# Patient Record
Sex: MALE | Race: WHITE | NOT HISPANIC OR LATINO
[De-identification: names, ages, dates, MRNs, and addresses within clinical notes are randomized per-mention and may not be internally consistent; named-entity substitution may affect disease eponyms.]

---

## 2019-04-11 ENCOUNTER — APPOINTMENT (OUTPATIENT)
Dept: INTERNAL MEDICINE | Facility: CLINIC | Age: 79
End: 2019-04-11
Payer: MEDICARE

## 2019-04-11 VITALS
OXYGEN SATURATION: 95 % | RESPIRATION RATE: 14 BRPM | BODY MASS INDEX: 29.99 KG/M2 | HEART RATE: 67 BPM | DIASTOLIC BLOOD PRESSURE: 81 MMHG | WEIGHT: 180 LBS | SYSTOLIC BLOOD PRESSURE: 173 MMHG | HEIGHT: 65 IN | TEMPERATURE: 98.2 F

## 2019-04-11 DIAGNOSIS — Z83.3 FAMILY HISTORY OF DIABETES MELLITUS: ICD-10-CM

## 2019-04-11 PROCEDURE — 99204 OFFICE O/P NEW MOD 45 MIN: CPT | Mod: 25

## 2019-04-11 PROCEDURE — 36415 COLL VENOUS BLD VENIPUNCTURE: CPT

## 2019-04-11 RX ORDER — DICLOFENAC SODIUM 1 %
1 KIT TOPICAL
Refills: 0 | Status: ACTIVE | COMMUNITY

## 2019-04-11 RX ORDER — LORATADINE 5 MG
17 TABLET,CHEWABLE ORAL
Refills: 0 | Status: ACTIVE | COMMUNITY

## 2019-04-11 NOTE — HEALTH RISK ASSESSMENT
[Good] : ~his/her~  mood as  good [] : No [No falls in past year] : Patient reported no falls in the past year [0] : 2) Feeling down, depressed, or hopeless: Not at all (0)

## 2019-04-11 NOTE — PHYSICAL EXAM

## 2019-04-11 NOTE — HISTORY OF PRESENT ILLNESS
[FreeTextEntry1] : here for physical no complaints [de-identified] : 78,yo wm with hx of CAD, DM,HTN, HYPERLIPIDEMIA,CANCER OF PROSTATE CAME TO THE OFFICE FOR PHYSICAL\par SEEN BY HIS CARDIOLOGIST RECENTLY AND TALD ALL WAS GOOD AS PER PATIENT

## 2019-04-20 LAB
ALBUMIN SERPL ELPH-MCNC: 5 G/DL
ALP BLD-CCNC: 78 U/L
ALT SERPL-CCNC: 14 U/L
ANION GAP SERPL CALC-SCNC: 16 MMOL/L
APPEARANCE: CLEAR
AST SERPL-CCNC: 22 U/L
BACTERIA: NEGATIVE
BASOPHILS # BLD AUTO: 0.04 K/UL
BASOPHILS NFR BLD AUTO: 0.8 %
BILIRUB SERPL-MCNC: 0.4 MG/DL
BILIRUBIN URINE: NEGATIVE
BLOOD URINE: NEGATIVE
BUN SERPL-MCNC: 14 MG/DL
CALCIUM SERPL-MCNC: 9.5 MG/DL
CHLORIDE SERPL-SCNC: 97 MMOL/L
CO2 SERPL-SCNC: 24 MMOL/L
COLOR: NORMAL
CREAT SERPL-MCNC: 0.77 MG/DL
EOSINOPHIL # BLD AUTO: 0.08 K/UL
EOSINOPHIL NFR BLD AUTO: 1.6 %
GLUCOSE QUALITATIVE U: NEGATIVE
GLUCOSE SERPL-MCNC: 100 MG/DL
HCT VFR BLD CALC: 46.1 %
HGB BLD-MCNC: 15 G/DL
HYALINE CASTS: 0 /LPF
IMM GRANULOCYTES NFR BLD AUTO: 0.2 %
KETONES URINE: NEGATIVE
LEUKOCYTE ESTERASE URINE: NEGATIVE
LYMPHOCYTES # BLD AUTO: 0.61 K/UL
LYMPHOCYTES NFR BLD AUTO: 12.5 %
MAN DIFF?: NORMAL
MCHC RBC-ENTMCNC: 30.7 PG
MCHC RBC-ENTMCNC: 32.5 GM/DL
MCV RBC AUTO: 94.3 FL
MICROSCOPIC-UA: NORMAL
MONOCYTES # BLD AUTO: 0.49 K/UL
MONOCYTES NFR BLD AUTO: 10 %
NEUTROPHILS # BLD AUTO: 3.66 K/UL
NEUTROPHILS NFR BLD AUTO: 74.9 %
NITRITE URINE: NEGATIVE
PH URINE: 6.5
PLATELET # BLD AUTO: 208 K/UL
POTASSIUM SERPL-SCNC: 4.1 MMOL/L
PROT SERPL-MCNC: 7.3 G/DL
PROTEIN URINE: NEGATIVE
PSA FREE FLD-MCNC: 19 %
PSA FREE SERPL-MCNC: 0.5 NG/ML
PSA SERPL-MCNC: 2.7 NG/ML
RBC # BLD: 4.89 M/UL
RBC # FLD: 12.8 %
RED BLOOD CELLS URINE: 1 /HPF
SODIUM SERPL-SCNC: 137 MMOL/L
SPECIFIC GRAVITY URINE: 1.01
SQUAMOUS EPITHELIAL CELLS: 0 /HPF
UROBILINOGEN URINE: NORMAL
WBC # FLD AUTO: 4.89 K/UL
WHITE BLOOD CELLS URINE: 0 /HPF

## 2019-04-22 LAB
CHOLEST SERPL-MCNC: 227 MG/DL
CHOLEST/HDLC SERPL: 4.2 RATIO
HDLC SERPL-MCNC: 54 MG/DL
LDLC SERPL CALC-MCNC: 151 MG/DL
TRIGL SERPL-MCNC: 108 MG/DL

## 2019-05-17 ENCOUNTER — MEDICATION RENEWAL (OUTPATIENT)
Age: 79
End: 2019-05-17

## 2019-06-17 ENCOUNTER — MEDICATION RENEWAL (OUTPATIENT)
Age: 79
End: 2019-06-17

## 2019-07-10 ENCOUNTER — APPOINTMENT (OUTPATIENT)
Dept: INTERNAL MEDICINE | Facility: CLINIC | Age: 79
End: 2019-07-10
Payer: MEDICARE

## 2019-07-10 VITALS
SYSTOLIC BLOOD PRESSURE: 129 MMHG | DIASTOLIC BLOOD PRESSURE: 76 MMHG | TEMPERATURE: 98.3 F | OXYGEN SATURATION: 97 % | WEIGHT: 182 LBS | RESPIRATION RATE: 12 BRPM | BODY MASS INDEX: 30.32 KG/M2 | HEIGHT: 65 IN | HEART RATE: 69 BPM

## 2019-07-10 PROCEDURE — 36415 COLL VENOUS BLD VENIPUNCTURE: CPT

## 2019-07-10 PROCEDURE — 99214 OFFICE O/P EST MOD 30 MIN: CPT | Mod: 25

## 2019-07-13 NOTE — HISTORY OF PRESENT ILLNESS
[FreeTextEntry1] : HERE FOR F/U.\par NO COMPLAINTS [de-identified] : 80 YO WM WITH HX OF DM, HTN, HYPERLIPIDEMIA, Ca PROSTATE CAME TO THE OFFICE FOR F/U.NEEDS TO RENEW MEDICATIONS\par SINCE LAST VISIT HE WAS SEEN BY  CONSULTANT\par PT. OFFERS NO COMPLAINTS

## 2019-07-13 NOTE — PHYSICAL EXAM
[No Acute Distress] : no acute distress [Well Developed] : well developed [Well Nourished] : well nourished [Well-Appearing] : well-appearing [PERRL] : pupils equal round and reactive to light [Normal Sclera/Conjunctiva] : normal sclera/conjunctiva [EOMI] : extraocular movements intact [Normal Oropharynx] : the oropharynx was normal [Normal Outer Ear/Nose] : the outer ears and nose were normal in appearance [No JVD] : no jugular venous distention [No Lymphadenopathy] : no lymphadenopathy [Supple] : supple [Thyroid Normal, No Nodules] : the thyroid was normal and there were no nodules present [Clear to Auscultation] : lungs were clear to auscultation bilaterally [No Respiratory Distress] : no respiratory distress  [No Accessory Muscle Use] : no accessory muscle use [Normal S1, S2] : normal S1 and S2 [Regular Rhythm] : with a regular rhythm [Normal Rate] : normal rate  [No Murmur] : no murmur heard [No Carotid Bruits] : no carotid bruits [No Varicosities] : no varicosities [Pedal Pulses Present] : the pedal pulses are present [No Abdominal Bruit] : a ~M bruit was not heard ~T in the abdomen [No Edema] : there was no peripheral edema [No Extremity Clubbing/Cyanosis] : no extremity clubbing/cyanosis [No Palpable Aorta] : no palpable aorta [Soft] : abdomen soft [Non Tender] : non-tender [No Masses] : no abdominal mass palpated [Non-distended] : non-distended [No HSM] : no HSM [Normal Bowel Sounds] : normal bowel sounds [Normal Posterior Cervical Nodes] : no posterior cervical lymphadenopathy [Normal Anterior Cervical Nodes] : no anterior cervical lymphadenopathy [No CVA Tenderness] : no CVA  tenderness [No Spinal Tenderness] : no spinal tenderness [No Joint Swelling] : no joint swelling [Grossly Normal Strength/Tone] : grossly normal strength/tone [Coordination Grossly Intact] : coordination grossly intact [No Rash] : no rash [Normal Gait] : normal gait [No Focal Deficits] : no focal deficits [Normal Affect] : the affect was normal [Deep Tendon Reflexes (DTR)] : deep tendon reflexes were 2+ and symmetric [Normal Insight/Judgement] : insight and judgment were intact [FreeTextEntry1] : DEFERRED

## 2019-07-15 LAB
ALBUMIN SERPL ELPH-MCNC: 4.6 G/DL
ALP BLD-CCNC: 75 U/L
ALT SERPL-CCNC: 12 U/L
AST SERPL-CCNC: 15 U/L
BILIRUB DIRECT SERPL-MCNC: 0.1 MG/DL
BILIRUB INDIRECT SERPL-MCNC: 0.2 MG/DL
BILIRUB SERPL-MCNC: 0.3 MG/DL
CHOLEST SERPL-MCNC: 206 MG/DL
CHOLEST/HDLC SERPL: 4.2 RATIO
ESTIMATED AVERAGE GLUCOSE: 128 MG/DL
GLUCOSE BS SERPL-MCNC: 115 MG/DL
HBA1C MFR BLD HPLC: 6.1 %
HDLC SERPL-MCNC: 49 MG/DL
LDLC SERPL CALC-MCNC: 129 MG/DL
PROT SERPL-MCNC: 6.8 G/DL
TRIGL SERPL-MCNC: 141 MG/DL

## 2019-11-11 ENCOUNTER — APPOINTMENT (OUTPATIENT)
Dept: INTERNAL MEDICINE | Facility: CLINIC | Age: 79
End: 2019-11-11
Payer: MEDICARE

## 2019-11-11 VITALS
DIASTOLIC BLOOD PRESSURE: 74 MMHG | RESPIRATION RATE: 14 BRPM | SYSTOLIC BLOOD PRESSURE: 120 MMHG | WEIGHT: 179 LBS | BODY MASS INDEX: 29.82 KG/M2 | HEART RATE: 71 BPM | OXYGEN SATURATION: 96 % | HEIGHT: 65 IN | TEMPERATURE: 98.2 F

## 2019-11-11 PROCEDURE — 90674 CCIIV4 VAC NO PRSV 0.5 ML IM: CPT

## 2019-11-11 PROCEDURE — 99213 OFFICE O/P EST LOW 20 MIN: CPT | Mod: 25

## 2019-11-11 PROCEDURE — G0008: CPT

## 2019-11-16 NOTE — PHYSICAL EXAM
[No Acute Distress] : no acute distress [Well Developed] : well developed [Well Nourished] : well nourished [Well-Appearing] : well-appearing [Normal Sclera/Conjunctiva] : normal sclera/conjunctiva [Normal Outer Ear/Nose] : the outer ears and nose were normal in appearance [PERRL] : pupils equal round and reactive to light [EOMI] : extraocular movements intact [No Lymphadenopathy] : no lymphadenopathy [No JVD] : no jugular venous distention [Normal Oropharynx] : the oropharynx was normal [Supple] : supple [Thyroid Normal, No Nodules] : the thyroid was normal and there were no nodules present [No Respiratory Distress] : no respiratory distress  [No Accessory Muscle Use] : no accessory muscle use [Clear to Auscultation] : lungs were clear to auscultation bilaterally [Normal Rate] : normal rate  [Regular Rhythm] : with a regular rhythm [Normal S1, S2] : normal S1 and S2 [No Carotid Bruits] : no carotid bruits [No Murmur] : no murmur heard [No Abdominal Bruit] : a ~M bruit was not heard ~T in the abdomen [No Varicosities] : no varicosities [Pedal Pulses Present] : the pedal pulses are present [No Edema] : there was no peripheral edema [No Palpable Aorta] : no palpable aorta [No Extremity Clubbing/Cyanosis] : no extremity clubbing/cyanosis [Non Tender] : non-tender [Soft] : abdomen soft [No Masses] : no abdominal mass palpated [Non-distended] : non-distended [No HSM] : no HSM [Normal Bowel Sounds] : normal bowel sounds [Normal Posterior Cervical Nodes] : no posterior cervical lymphadenopathy [No CVA Tenderness] : no CVA  tenderness [No Spinal Tenderness] : no spinal tenderness [Normal Anterior Cervical Nodes] : no anterior cervical lymphadenopathy [No Joint Swelling] : no joint swelling [Grossly Normal Strength/Tone] : grossly normal strength/tone [No Rash] : no rash [Coordination Grossly Intact] : coordination grossly intact [Deep Tendon Reflexes (DTR)] : deep tendon reflexes were 2+ and symmetric [Normal Gait] : normal gait [No Focal Deficits] : no focal deficits [Normal Insight/Judgement] : insight and judgment were intact [Normal Affect] : the affect was normal [FreeTextEntry1] : DEFERRED

## 2019-11-16 NOTE — HISTORY OF PRESENT ILLNESS
[FreeTextEntry1] : pt here for f/bp and for flu vaccine [de-identified] : 78 yo wm with hx of hyperlipidemia, DA,CAD and Ca prostate, CAME TO THE OFFICE FOR F/U. FEELING OK.SEEN BY  AND HAD BLOOD TESTS DONE. HE WAS TOLD THAT ALL WAS GOOD.

## 2019-11-16 NOTE — HISTORY OF PRESENT ILLNESS
[FreeTextEntry1] : pt here for f/bp and for flu vaccine [de-identified] : 78 yo wm with hx of hyperlipidemia, DA,CAD and Ca prostate, CAME TO THE OFFICE FOR F/U. FEELING OK.SEEN BY  AND HAD BLOOD TESTS DONE. HE WAS TOLD THAT ALL WAS GOOD.

## 2020-03-12 ENCOUNTER — RX RENEWAL (OUTPATIENT)
Age: 80
End: 2020-03-12

## 2020-04-10 ENCOUNTER — APPOINTMENT (OUTPATIENT)
Dept: INTERNAL MEDICINE | Facility: CLINIC | Age: 80
End: 2020-04-10

## 2020-05-12 ENCOUNTER — RX RENEWAL (OUTPATIENT)
Age: 80
End: 2020-05-12

## 2020-05-12 RX ORDER — LANCETS 30 GAUGE
EACH MISCELLANEOUS
Qty: 100 | Refills: 1 | Status: ACTIVE | COMMUNITY
Start: 2020-05-12 | End: 1900-01-01

## 2020-05-12 RX ORDER — TAMSULOSIN HCL 0.4 MG
0.4 CAPSULE ORAL
Refills: 0 | Status: COMPLETED | COMMUNITY
End: 2020-05-12

## 2020-05-14 ENCOUNTER — APPOINTMENT (OUTPATIENT)
Dept: INTERNAL MEDICINE | Facility: CLINIC | Age: 80
End: 2020-05-14
Payer: MEDICARE

## 2020-05-14 VITALS
TEMPERATURE: 98.1 F | HEART RATE: 70 BPM | BODY MASS INDEX: 30.16 KG/M2 | OXYGEN SATURATION: 98 % | WEIGHT: 181 LBS | DIASTOLIC BLOOD PRESSURE: 80 MMHG | SYSTOLIC BLOOD PRESSURE: 128 MMHG | RESPIRATION RATE: 15 BRPM | HEIGHT: 65 IN

## 2020-05-14 PROCEDURE — 36415 COLL VENOUS BLD VENIPUNCTURE: CPT

## 2020-05-14 PROCEDURE — G0439: CPT

## 2020-05-17 NOTE — HEALTH RISK ASSESSMENT
[Good] : ~his/her~  mood as  good [No] : In the past 12 months have you used drugs other than those required for medical reasons? No [No falls in past year] : Patient reported no falls in the past year [0] : 2) Feeling down, depressed, or hopeless: Not at all (0) [] : No [Audit-CScore] : 0 [RPN2Fhogh] : 0

## 2020-05-17 NOTE — HISTORY OF PRESENT ILLNESS
[FreeTextEntry1] : physical \par offers no complaints [de-identified] : [age] WM , WITH HX OF CAD, HTN,HLD AND DM CAME TO THE OFFICE FOR PHYSICAL. FEELING OK .NO COMPLAINTS

## 2020-05-18 LAB
ALBUMIN SERPL ELPH-MCNC: 4.7 G/DL
ALP BLD-CCNC: 76 U/L
ALT SERPL-CCNC: 12 U/L
ANION GAP SERPL CALC-SCNC: 15 MMOL/L
APPEARANCE: CLEAR
AST SERPL-CCNC: 18 U/L
BACTERIA: NEGATIVE
BASOPHILS # BLD AUTO: 0.04 K/UL
BASOPHILS NFR BLD AUTO: 0.8 %
BILIRUB SERPL-MCNC: 0.5 MG/DL
BILIRUBIN URINE: NEGATIVE
BLOOD URINE: NEGATIVE
BUN SERPL-MCNC: 12 MG/DL
CALCIUM SERPL-MCNC: 9.6 MG/DL
CHLORIDE SERPL-SCNC: 94 MMOL/L
CHOLEST SERPL-MCNC: 211 MG/DL
CHOLEST/HDLC SERPL: 3.9 RATIO
CO2 SERPL-SCNC: 25 MMOL/L
COLOR: NORMAL
CREAT SERPL-MCNC: 0.83 MG/DL
EOSINOPHIL # BLD AUTO: 0.11 K/UL
EOSINOPHIL NFR BLD AUTO: 2.2 %
ESTIMATED AVERAGE GLUCOSE: 131 MG/DL
GLUCOSE QUALITATIVE U: NEGATIVE
GLUCOSE SERPL-MCNC: 113 MG/DL
HBA1C MFR BLD HPLC: 6.2 %
HCT VFR BLD CALC: 43.4 %
HDLC SERPL-MCNC: 54 MG/DL
HGB BLD-MCNC: 14.1 G/DL
HYALINE CASTS: 0 /LPF
IMM GRANULOCYTES NFR BLD AUTO: 0.2 %
KETONES URINE: NEGATIVE
LDLC SERPL CALC-MCNC: 128 MG/DL
LEUKOCYTE ESTERASE URINE: NEGATIVE
LYMPHOCYTES # BLD AUTO: 0.86 K/UL
LYMPHOCYTES NFR BLD AUTO: 17 %
MAN DIFF?: NORMAL
MCHC RBC-ENTMCNC: 30 PG
MCHC RBC-ENTMCNC: 32.5 GM/DL
MCV RBC AUTO: 92.3 FL
MICROSCOPIC-UA: NORMAL
MONOCYTES # BLD AUTO: 0.61 K/UL
MONOCYTES NFR BLD AUTO: 12.1 %
NEUTROPHILS # BLD AUTO: 3.43 K/UL
NEUTROPHILS NFR BLD AUTO: 67.7 %
NITRITE URINE: NEGATIVE
PH URINE: 7
PLATELET # BLD AUTO: 230 K/UL
POTASSIUM SERPL-SCNC: 4.4 MMOL/L
PROT SERPL-MCNC: 6.6 G/DL
PROTEIN URINE: NEGATIVE
PSA SERPL-MCNC: 2.73 NG/ML
RBC # BLD: 4.7 M/UL
RBC # FLD: 12.7 %
RED BLOOD CELLS URINE: 1 /HPF
SODIUM SERPL-SCNC: 134 MMOL/L
SPECIFIC GRAVITY URINE: 1.01
SQUAMOUS EPITHELIAL CELLS: 0 /HPF
T3 SERPL-MCNC: 115 NG/DL
T4 FREE SERPL-MCNC: 1.2 NG/DL
T4 SERPL-MCNC: 6.9 UG/DL
TRIGL SERPL-MCNC: 140 MG/DL
TSH SERPL-ACNC: 2.72 UIU/ML
UROBILINOGEN URINE: NORMAL
WBC # FLD AUTO: 5.06 K/UL
WHITE BLOOD CELLS URINE: 0 /HPF

## 2020-09-16 ENCOUNTER — RX RENEWAL (OUTPATIENT)
Age: 80
End: 2020-09-16

## 2020-09-18 ENCOUNTER — RX RENEWAL (OUTPATIENT)
Age: 80
End: 2020-09-18

## 2020-09-18 ENCOUNTER — APPOINTMENT (OUTPATIENT)
Dept: INTERNAL MEDICINE | Facility: CLINIC | Age: 80
End: 2020-09-18
Payer: MEDICARE

## 2020-09-18 VITALS
BODY MASS INDEX: 35.65 KG/M2 | HEART RATE: 96 BPM | SYSTOLIC BLOOD PRESSURE: 152 MMHG | HEIGHT: 65 IN | WEIGHT: 214 LBS | RESPIRATION RATE: 15 BRPM | TEMPERATURE: 97.3 F | OXYGEN SATURATION: 97 % | DIASTOLIC BLOOD PRESSURE: 90 MMHG

## 2020-09-18 PROCEDURE — 99214 OFFICE O/P EST MOD 30 MIN: CPT | Mod: 25

## 2020-09-18 PROCEDURE — G0008: CPT

## 2020-09-18 PROCEDURE — 90686 IIV4 VACC NO PRSV 0.5 ML IM: CPT

## 2020-09-18 PROCEDURE — 36415 COLL VENOUS BLD VENIPUNCTURE: CPT

## 2020-09-18 RX ORDER — LANCETS 30 GAUGE
EACH MISCELLANEOUS
Qty: 100 | Refills: 1 | Status: ACTIVE | COMMUNITY
Start: 2020-09-18 | End: 1900-01-01

## 2020-09-20 NOTE — HEALTH RISK ASSESSMENT
[Never (0 pts)] : Never (0 points) [No] : In the past 12 months have you used drugs other than those required for medical reasons? No [No falls in past year] : Patient reported no falls in the past year [0] : 2) Feeling down, depressed, or hopeless: Not at all (0) [] : No [Audit-CScore] : 0 [de-identified] : lightly active [de-identified] : ADA diet [[id number]] : 0

## 2020-09-20 NOTE — PLAN
[FreeTextEntry1] : Blood tests sent to the lab \par \par Influenza vaccination administered today\par \par Screening for COVID-19 antibodies

## 2020-09-20 NOTE — HISTORY OF PRESENT ILLNESS
[FreeTextEntry1] : Follow up DM [de-identified] : AGUILAR PETERS is a 80 year old male patient with a PMHx of CAD, DM, HLD, and prostate CA who presents today for follow up. He is feeling well and offers no complaints. Patient requests immunization for influenza.

## 2020-09-20 NOTE — END OF VISIT
[FreeTextEntry3] : I, Samantha Fontaine, personally transcribed these services in the presence of Dr. Hema Snyder MD. I, Dr. Hema Snyder MD, personally performed the services described in this documentation as scribed by Samantha Fontanie in my presence and it is both accurate and complete.

## 2020-09-20 NOTE — PHYSICAL EXAM
[No Acute Distress] : no acute distress [Well Nourished] : well nourished [Well Developed] : well developed [Well-Appearing] : well-appearing [Normal Sclera/Conjunctiva] : normal sclera/conjunctiva [PERRL] : pupils equal round and reactive to light [EOMI] : extraocular movements intact [Normal Outer Ear/Nose] : the outer ears and nose were normal in appearance [Normal Oropharynx] : the oropharynx was normal [No JVD] : no jugular venous distention [No Lymphadenopathy] : no lymphadenopathy [Supple] : supple [Thyroid Normal, No Nodules] : the thyroid was normal and there were no nodules present [No Respiratory Distress] : no respiratory distress  [No Accessory Muscle Use] : no accessory muscle use [Clear to Auscultation] : lungs were clear to auscultation bilaterally [Normal Rate] : normal rate  [Regular Rhythm] : with a regular rhythm [Normal S1, S2] : normal S1 and S2 [No Murmur] : no murmur heard [No Carotid Bruits] : no carotid bruits [No Abdominal Bruit] : a ~M bruit was not heard ~T in the abdomen [No Varicosities] : no varicosities [Pedal Pulses Present] : the pedal pulses are present [No Edema] : there was no peripheral edema [No Palpable Aorta] : no palpable aorta [No Extremity Clubbing/Cyanosis] : no extremity clubbing/cyanosis [Soft] : abdomen soft [Non Tender] : non-tender [Non-distended] : non-distended [No Masses] : no abdominal mass palpated [No HSM] : no HSM [Normal Bowel Sounds] : normal bowel sounds [Normal Posterior Cervical Nodes] : no posterior cervical lymphadenopathy [Normal Anterior Cervical Nodes] : no anterior cervical lymphadenopathy [No CVA Tenderness] : no CVA  tenderness [No Spinal Tenderness] : no spinal tenderness [No Joint Swelling] : no joint swelling [Grossly Normal Strength/Tone] : grossly normal strength/tone [No Rash] : no rash [Coordination Grossly Intact] : coordination grossly intact [No Focal Deficits] : no focal deficits [Normal Gait] : normal gait [Normal Affect] : the affect was normal [Normal Insight/Judgement] : insight and judgment were intact [FreeTextEntry1] : deferred

## 2020-09-22 LAB
ALBUMIN SERPL ELPH-MCNC: 4.8 G/DL
ALP BLD-CCNC: 80 U/L
ALT SERPL-CCNC: 12 U/L
ANION GAP SERPL CALC-SCNC: 13 MMOL/L
APPEARANCE: CLEAR
AST SERPL-CCNC: 19 U/L
BACTERIA: NEGATIVE
BASOPHILS # BLD AUTO: 0.03 K/UL
BASOPHILS NFR BLD AUTO: 0.8 %
BILIRUB SERPL-MCNC: 0.4 MG/DL
BILIRUBIN URINE: NEGATIVE
BLOOD URINE: NEGATIVE
BUN SERPL-MCNC: 11 MG/DL
CALCIUM SERPL-MCNC: 9.6 MG/DL
CHLORIDE SERPL-SCNC: 96 MMOL/L
CHOLEST SERPL-MCNC: 181 MG/DL
CHOLEST/HDLC SERPL: 3.2 RATIO
CO2 SERPL-SCNC: 25 MMOL/L
COLOR: NORMAL
CREAT SERPL-MCNC: 0.85 MG/DL
CREAT SPEC-SCNC: 48 MG/DL
EOSINOPHIL # BLD AUTO: 0.06 K/UL
EOSINOPHIL NFR BLD AUTO: 1.6 %
ESTIMATED AVERAGE GLUCOSE: 128 MG/DL
GLUCOSE QUALITATIVE U: NEGATIVE
GLUCOSE SERPL-MCNC: 106 MG/DL
HBA1C MFR BLD HPLC: 6.1 %
HCT VFR BLD CALC: 41.9 %
HDLC SERPL-MCNC: 57 MG/DL
HGB BLD-MCNC: 13.9 G/DL
HYALINE CASTS: 0 /LPF
IMM GRANULOCYTES NFR BLD AUTO: 0 %
KETONES URINE: NEGATIVE
LDLC SERPL CALC-MCNC: 110 MG/DL
LEUKOCYTE ESTERASE URINE: NEGATIVE
LYMPHOCYTES # BLD AUTO: 0.58 K/UL
LYMPHOCYTES NFR BLD AUTO: 15.9 %
MAN DIFF?: NORMAL
MCHC RBC-ENTMCNC: 30.2 PG
MCHC RBC-ENTMCNC: 33.2 GM/DL
MCV RBC AUTO: 91.1 FL
MICROALBUMIN 24H UR DL<=1MG/L-MCNC: <1.2 MG/DL
MICROALBUMIN/CREAT 24H UR-RTO: NORMAL MG/G
MICROSCOPIC-UA: NORMAL
MONOCYTES # BLD AUTO: 0.4 K/UL
MONOCYTES NFR BLD AUTO: 11 %
NEUTROPHILS # BLD AUTO: 2.57 K/UL
NEUTROPHILS NFR BLD AUTO: 70.7 %
NITRITE URINE: NEGATIVE
PH URINE: 6.5
PLATELET # BLD AUTO: 245 K/UL
POTASSIUM SERPL-SCNC: 5.3 MMOL/L
PROT SERPL-MCNC: 6.7 G/DL
PROTEIN URINE: NEGATIVE
PSA SERPL-MCNC: 0.67 NG/ML
RBC # BLD: 4.6 M/UL
RBC # FLD: 12.6 %
RED BLOOD CELLS URINE: 1 /HPF
SARS-COV-2 IGG SERPL IA-ACNC: 0.01 INDEX
SARS-COV-2 IGG SERPL QL IA: NEGATIVE
SODIUM SERPL-SCNC: 134 MMOL/L
SPECIFIC GRAVITY URINE: 1.01
SQUAMOUS EPITHELIAL CELLS: 0 /HPF
TRIGL SERPL-MCNC: 73 MG/DL
UROBILINOGEN URINE: NORMAL
WBC # FLD AUTO: 3.64 K/UL
WHITE BLOOD CELLS URINE: 0 /HPF

## 2021-03-04 ENCOUNTER — RX RENEWAL (OUTPATIENT)
Age: 81
End: 2021-03-04

## 2021-03-17 ENCOUNTER — RX RENEWAL (OUTPATIENT)
Age: 81
End: 2021-03-17

## 2021-03-29 ENCOUNTER — RX RENEWAL (OUTPATIENT)
Age: 81
End: 2021-03-29

## 2021-05-31 ENCOUNTER — RX RENEWAL (OUTPATIENT)
Age: 81
End: 2021-05-31

## 2021-06-09 ENCOUNTER — APPOINTMENT (OUTPATIENT)
Dept: INTERNAL MEDICINE | Facility: CLINIC | Age: 81
End: 2021-06-09
Payer: MEDICARE

## 2021-06-09 VITALS
HEIGHT: 65 IN | OXYGEN SATURATION: 96 % | RESPIRATION RATE: 14 BRPM | HEART RATE: 69 BPM | WEIGHT: 178 LBS | DIASTOLIC BLOOD PRESSURE: 72 MMHG | TEMPERATURE: 97.1 F | BODY MASS INDEX: 29.66 KG/M2 | SYSTOLIC BLOOD PRESSURE: 130 MMHG

## 2021-06-09 PROCEDURE — 36415 COLL VENOUS BLD VENIPUNCTURE: CPT

## 2021-06-09 PROCEDURE — G0439: CPT

## 2021-06-09 PROCEDURE — 93000 ELECTROCARDIOGRAM COMPLETE: CPT | Mod: 59

## 2021-06-11 NOTE — PHYSICAL EXAM
[No Acute Distress] : no acute distress [Well Nourished] : well nourished [Well Developed] : well developed [Well-Appearing] : well-appearing [Normal Sclera/Conjunctiva] : normal sclera/conjunctiva [PERRL] : pupils equal round and reactive to light [EOMI] : extraocular movements intact [Normal Outer Ear/Nose] : the outer ears and nose were normal in appearance [Normal Oropharynx] : the oropharynx was normal [No JVD] : no jugular venous distention [No Lymphadenopathy] : no lymphadenopathy [Supple] : supple [Thyroid Normal, No Nodules] : the thyroid was normal and there were no nodules present [No Respiratory Distress] : no respiratory distress  [No Accessory Muscle Use] : no accessory muscle use [Clear to Auscultation] : lungs were clear to auscultation bilaterally [Normal Rate] : normal rate  [Regular Rhythm] : with a regular rhythm [Normal S1, S2] : normal S1 and S2 [No Murmur] : no murmur heard [No Carotid Bruits] : no carotid bruits [No Abdominal Bruit] : a ~M bruit was not heard ~T in the abdomen [No Varicosities] : no varicosities [Pedal Pulses Present] : the pedal pulses are present [No Edema] : there was no peripheral edema [No Palpable Aorta] : no palpable aorta [No Extremity Clubbing/Cyanosis] : no extremity clubbing/cyanosis [Soft] : abdomen soft [Non Tender] : non-tender [Non-distended] : non-distended [No Masses] : no abdominal mass palpated [No HSM] : no HSM [Normal Bowel Sounds] : normal bowel sounds [Normal Sphincter Tone] : normal sphincter tone [No Mass] : no mass [Normal Posterior Cervical Nodes] : no posterior cervical lymphadenopathy [Normal Anterior Cervical Nodes] : no anterior cervical lymphadenopathy [No CVA Tenderness] : no CVA  tenderness [No Spinal Tenderness] : no spinal tenderness [No Joint Swelling] : no joint swelling [Grossly Normal Strength/Tone] : grossly normal strength/tone [No Rash] : no rash [Coordination Grossly Intact] : coordination grossly intact [No Focal Deficits] : no focal deficits [Normal Gait] : normal gait [Deep Tendon Reflexes (DTR)] : deep tendon reflexes were 2+ and symmetric [Normal Affect] : the affect was normal [Normal Insight/Judgement] : insight and judgment were intact [Stool Occult Blood] : stool negative for occult blood [de-identified] : 2/6 DEBBIE/LSB [FreeTextEntry1] : guaiac negative

## 2021-06-11 NOTE — HEALTH RISK ASSESSMENT
[Never (0 pts)] : Never (0 points) [No] : In the past 12 months have you used drugs other than those required for medical reasons? No [No falls in past year] : Patient reported no falls in the past year [0] : 2) Feeling down, depressed, or hopeless: Not at all (0) [Good] : ~his/her~  mood as  good [Audit-CScore] : 0 [] : No [de-identified] : lightly active [de-identified] : ADA diet [[id number]] : 0 [ColonoscopyDate] : 01/2017

## 2021-06-11 NOTE — HISTORY OF PRESENT ILLNESS
[FreeTextEntry1] : Physical examination  [de-identified] : AGUILAR PETERS is a 81 year old male with a PMHx of CAD, DM, HLD, and prostate CA who presents today for physical examination. He is feeling okay and offers no specific complaints. \par \par Pt has completed COVID-19 vaccine series (Moderna) on 04/14/2021.

## 2021-06-11 NOTE — END OF VISIT
[FreeTextEntry3] : I, Jelena Orellana, personally transcribed these services in the presence of Dr. Hema Snyder MD. I, Dr. Hema Snyder MD, personally performed the services described in this documentation as scribed by Jelena Orellana in my presence and it is both accurate and complete.\par

## 2021-06-11 NOTE — PLAN
[FreeTextEntry1] : Blood tests and urine sent to the lab\par \par EKG\par \par Cardiology evaluation

## 2021-06-13 LAB
25(OH)D3 SERPL-MCNC: 41.7 NG/ML
ALBUMIN SERPL ELPH-MCNC: 4.8 G/DL
ALP BLD-CCNC: 85 U/L
ALT SERPL-CCNC: 15 U/L
ANION GAP SERPL CALC-SCNC: 15 MMOL/L
APPEARANCE: CLEAR
AST SERPL-CCNC: 20 U/L
BACTERIA: NEGATIVE
BILIRUB SERPL-MCNC: 0.4 MG/DL
BILIRUBIN URINE: NEGATIVE
BLOOD URINE: NEGATIVE
BUN SERPL-MCNC: 12 MG/DL
CALCIUM SERPL-MCNC: 9.4 MG/DL
CHLORIDE SERPL-SCNC: 96 MMOL/L
CHOLEST SERPL-MCNC: 183 MG/DL
CO2 SERPL-SCNC: 22 MMOL/L
COLOR: NORMAL
CREAT SERPL-MCNC: 0.81 MG/DL
ESTIMATED AVERAGE GLUCOSE: 131 MG/DL
GLUCOSE QUALITATIVE U: NEGATIVE
GLUCOSE SERPL-MCNC: 110 MG/DL
HBA1C MFR BLD HPLC: 6.2 %
HDLC SERPL-MCNC: 53 MG/DL
HYALINE CASTS: 0 /LPF
KETONES URINE: NEGATIVE
LDLC SERPL CALC-MCNC: 113 MG/DL
LEUKOCYTE ESTERASE URINE: NEGATIVE
MICROSCOPIC-UA: NORMAL
NITRITE URINE: NEGATIVE
NONHDLC SERPL-MCNC: 131 MG/DL
PH URINE: 7
POTASSIUM SERPL-SCNC: 4.9 MMOL/L
PROT SERPL-MCNC: 6.8 G/DL
PROTEIN URINE: NEGATIVE
PSA SERPL-MCNC: 0.38 NG/ML
RED BLOOD CELLS URINE: 2 /HPF
SODIUM SERPL-SCNC: 133 MMOL/L
SPECIFIC GRAVITY URINE: 1.01
SQUAMOUS EPITHELIAL CELLS: 0 /HPF
T3 SERPL-MCNC: 115 NG/DL
T4 FREE SERPL-MCNC: 1.1 NG/DL
T4 SERPL-MCNC: 6.7 UG/DL
TRIGL SERPL-MCNC: 86 MG/DL
TSH SERPL-ACNC: 1.86 UIU/ML
UROBILINOGEN URINE: NORMAL
VIT B12 SERPL-MCNC: 307 PG/ML
WHITE BLOOD CELLS URINE: 0 /HPF

## 2021-06-14 LAB
BASOPHILS # BLD AUTO: 0.04 K/UL
BASOPHILS NFR BLD AUTO: 0.7 %
EOSINOPHIL # BLD AUTO: 0.04 K/UL
EOSINOPHIL NFR BLD AUTO: 0.7 %
HCT VFR BLD CALC: 41.7 %
HGB BLD-MCNC: 13.8 G/DL
IMM GRANULOCYTES NFR BLD AUTO: 0.4 %
LYMPHOCYTES # BLD AUTO: 0.75 K/UL
LYMPHOCYTES NFR BLD AUTO: 13.9 %
MAN DIFF?: NORMAL
MCHC RBC-ENTMCNC: 30.1 PG
MCHC RBC-ENTMCNC: 33.1 GM/DL
MCV RBC AUTO: 90.8 FL
MONOCYTES # BLD AUTO: 0.52 K/UL
MONOCYTES NFR BLD AUTO: 9.6 %
NEUTROPHILS # BLD AUTO: 4.04 K/UL
NEUTROPHILS NFR BLD AUTO: 74.7 %
PLATELET # BLD AUTO: 217 K/UL
RBC # BLD: 4.59 M/UL
RBC # FLD: 12.8 %
WBC # FLD AUTO: 5.41 K/UL

## 2021-06-15 ENCOUNTER — APPOINTMENT (OUTPATIENT)
Dept: HEART AND VASCULAR | Facility: CLINIC | Age: 81
End: 2021-06-15
Payer: MEDICARE

## 2021-06-15 VITALS
SYSTOLIC BLOOD PRESSURE: 138 MMHG | WEIGHT: 178 LBS | OXYGEN SATURATION: 97 % | RESPIRATION RATE: 14 BRPM | BODY MASS INDEX: 29.66 KG/M2 | TEMPERATURE: 98.2 F | HEART RATE: 68 BPM | HEIGHT: 65 IN | DIASTOLIC BLOOD PRESSURE: 73 MMHG

## 2021-06-15 PROCEDURE — 99204 OFFICE O/P NEW MOD 45 MIN: CPT

## 2021-06-16 NOTE — ASSESSMENT
[FreeTextEntry1] : 81-year-old man with past medical history of CAD s/p PCI in 2010 at [facility],   diabetes, hyperlipidemia here for first evaluation.\par \par CAD s/p PCI \par -The setting of remote history of PCI without recent ischemia eval,  will obtain an nuclear stress test to rule out any ischemia\par -Echocardiogram to rule out any wall motion abnormalities\par -Continue aspirin 81 mg daily\par -Continue clopidogrel 75 mg daily\par -Continue metoprolol tartrate  12.5 mg po bid (consider switching to metoprolol succinate)\par \par HTN\par -borderline high\par -Continue losartan 25 mg daily\par -Continue metoprolol tartrate  12.5 mg po bid (consider switching to metoprolol succinate)\par -echo as above\par -CMP wnl 6/13/2021\par \par HLD\par  -fasting lipid panel \par \par f/u in 3 weeks for echo and  nuc stress test results

## 2021-06-16 NOTE — REASON FOR VISIT
[FreeTextEntry1] : 81-year-old man with past medical history of CAD s/p PCI in 2010 at [facility],   diabetes, hyperlipidemia here for first evaluation.\par The patient was followed by Dr. Melchor (now retired) \par The patient denies chest pain, shortness of breath, palpitations, syncope, presyncope, LE edema, orthopnea. \par The patient can walk up to 15 blocks without any symptoms \par Reports compliance with his medications\par \par \par MEDICATIONS: \par Aspirin 81 mg daily\par Clopidogrel 75 mg daily\par Losartan 25 mg daily\par Lovastatin  10 mg daily\par Metoprolol tartrate 12.5 mg twice a day

## 2021-07-08 ENCOUNTER — NON-APPOINTMENT (OUTPATIENT)
Age: 81
End: 2021-07-08

## 2021-07-08 ENCOUNTER — APPOINTMENT (OUTPATIENT)
Dept: HEART AND VASCULAR | Facility: CLINIC | Age: 81
End: 2021-07-08
Payer: MEDICARE

## 2021-07-08 VITALS
TEMPERATURE: 97.8 F | DIASTOLIC BLOOD PRESSURE: 73 MMHG | OXYGEN SATURATION: 96 % | BODY MASS INDEX: 29.66 KG/M2 | HEART RATE: 65 BPM | SYSTOLIC BLOOD PRESSURE: 119 MMHG | RESPIRATION RATE: 14 BRPM | HEIGHT: 65 IN | WEIGHT: 178 LBS

## 2021-07-08 PROCEDURE — 99215 OFFICE O/P EST HI 40 MIN: CPT

## 2021-07-08 RX ORDER — BLOOD-GLUCOSE METER
EACH MISCELLANEOUS
Qty: 3 | Refills: 1 | Status: ACTIVE | COMMUNITY
Start: 2020-09-18 | End: 1900-01-01

## 2021-07-08 NOTE — REASON FOR VISIT
[FreeTextEntry1] : 81-year-old man with past medical history of CAD s/p PCI in 2010 at [facility],   diabetes, hyperlipidemia here for follow-up evaluation and obtain nuclear stress test results. \par The patient was followed by Dr. Melchor (now retired) \par The patient denies chest pain, shortness of breath, palpitations, syncope, presyncope, LE edema, orthopnea. The patient can walk up to 15 blocks without any symptoms \par Reports compliance with his medications\par \par \par \par \par \par Pharmacological nuclear stress test 6/25/2021\par There is a medium sized reversible perfusion defect of moderate intensity involving the entire inferior and inferolateral myocardial wall suggestive of inducible ischemia in the posterior coronary circulation\par In addition there is a small reversible perfusion defect of mild intensity involving the mid apical anterior myocardial wall suggestive of inducible myocardial ischemia in the anterior LAD territory\par Normal left ventricular systolic function\par \par Echocardiogram 6/25/2021\par Normal left ventricular size and function\par Grade 1 diastolic dysfunction\par Trace MR\par Trace TR\par \par Current medications\par Clopidogrel 75 mg daily\par Metoprolol 12.5 twice a day\par Lovastatin 10 mg daily\par Metformin\par Losartan 12.5 twice a day\par Aspirin 81\par \par EKG 6/15/2021\par Sinus rhythm, left anterior fascicular block

## 2021-07-08 NOTE — ASSESSMENT
[FreeTextEntry1] : 81-year-old man with past medical history of CAD s/p PCI in 2010 at [facility],   diabetes, hyperlipidemia here for follow-up\par \par CAD s/p PCI \par -The setting of remote history of PCI with abnormal nuclear stress test findings, will recommend cardiac catheterization to define coronary anatomy (the patient was explained in details the risk and benefit of the procedures and agrees)\par -The patient will be scheduled for coronary angiogram at [facility] on August 6/2021\par -Continue aspirin 81 mg daily\par -Continue clopidogrel 75 mg daily\par -Continue metoprolol tartrate  12.5 mg po bid (consider switching to metoprolol succinate)\par \par HTN\par -well controlled today\par -Continue losartan 25 mg daily\par -Continue metoprolol tartrate  12.5 mg po bid (consider switching to metoprolol succinate)\par -echo as above\par -CMP wnl 6/13/2021\par \par HLD\par  -fasting lipid panel 6/13/2021 \par - recommend increasing statin to achieve LDL goal of less than 70, assess why intolerance to atorvastatin\par \par Follow-up post cardiac catheterization

## 2021-07-28 ENCOUNTER — RX RENEWAL (OUTPATIENT)
Age: 81
End: 2021-07-28

## 2021-08-02 VITALS
RESPIRATION RATE: 18 BRPM | HEIGHT: 66 IN | HEART RATE: 62 BPM | OXYGEN SATURATION: 96 % | DIASTOLIC BLOOD PRESSURE: 65 MMHG | WEIGHT: 177.91 LBS | SYSTOLIC BLOOD PRESSURE: 140 MMHG

## 2021-08-02 RX ORDER — METOPROLOL TARTRATE 50 MG
0 TABLET ORAL
Qty: 0 | Refills: 0 | DISCHARGE

## 2021-08-02 RX ORDER — CHLORHEXIDINE GLUCONATE 213 G/1000ML
1 SOLUTION TOPICAL ONCE
Refills: 0 | Status: DISCONTINUED | OUTPATIENT
Start: 2021-08-06 | End: 2021-08-07

## 2021-08-02 NOTE — H&P ADULT - ADDITIONAL PE
ASA III, Mallampati III  ECG: ASA III, Mallampati III  ECG: NSR @ 62bpm with 1st degree AV block (), incomplete RBBB

## 2021-08-02 NOTE — H&P ADULT - HISTORY OF PRESENT ILLNESS
****SKELETON***    CARDIOLOGIST:   COVID:  PHARMACY:  ESCORT:     Pt is 80yo ____ M w/ PMHx of HTN, HLD, DM T2, and CAD (s/p PCI mLAD 2010 @ [facility]) who presented to his cardiologist, Dr. Forbes endorsing ____. Pt denies CP, SOB, palpitations, syncope, presyncope, dizziness, LE edema, orthopnea, PND, N/V, fever/chills.     TTE (6/25/2021): normal LV systolic fxn, Grade I diastolic dysfunction, trace MR, trace TR. NST (6/25/2021): medium sized reversible perfusion defect of moderate intesnity in entire infeiror and inferolateral myocardial wall suggestive of inducible ischemic in the posterior coronary circulation.       Cardiac Cath (2010): LIZ mLAD; LM normal, mLCx 60%, mRCA 40%, mRPLS 50%; LVEF 50%.      ****SKELETON***    CARDIOLOGIST: Dr. Forbes   COVID:  PHARMACY:  ESCORT:     Pt is 80yo ____ M w/ PMHx of HTN, HLD, DM T2, and CAD (s/p PCI mLAD 2010 @ [facility]) who presented to his cardiologist, Dr. Forbes endorsing ____. Pt denies CP, SOB, palpitations, syncope, presyncope, dizziness, LE edema, orthopnea, PND, N/V, fever/chills.     TTE (6/25/2021): normal LV systolic fxn, Grade I diastolic dysfunction, trace MR, trace TR. NST (6/25/2021): medium sized reversible perfusion defect of moderate intensity in entire infeiror and inferolateral myocardial wall suggestive of inducible ischemic in the posterior coronary circulation.     In light of patient's risk factors, CCS Class ____ Anginal symptoms, and abnormal NST, patient now presents to [facility] for cardiac catheterization with possible intervention if clinically indicated.    Cardiac Cath (2010): LIZ mLAD; LM normal, mLCx 60%, mRCA 40%, mRPLS 60%; LVEF 50%.    CARDIOLOGIST: Dr. Forbes   COVID: vaccinated 5/2021  PHARMACY: [facility] (in [place]) and [facility]  ESCORT:     Pt is 82yo M w/ PMHx of HTN, HLD, DM T2, Prostate CA (s/p radiation in 4791-2850; in remission and no active treatment in progress), and CAD (s/p PCI mLAD 2010 @ [facility]) who presented to his cardiologist, Dr. Forbes, for cardiac evaluation given history of CAD w/ non-obstructive lesions left over (report below). Pt denies CP, SOB, palpitations, syncope, presyncope, dizziness, LE edema, orthopnea, PND, N/V, fever/chills.     TTE (6/25/2021): normal LV systolic fxn, Grade I diastolic dysfunction, trace MR, trace TR. NST (6/25/2021): medium sized reversible perfusion defect of moderate intensity in entire infeiror and inferolateral myocardial wall suggestive of inducible ischemic in the posterior coronary circulation. Cardiac Cath (2010): LIZ mLAD; LM normal, mLCx 60%, mRCA 40%, mRPLS 60%; LVEF 50%.     In light of patient's risk factors and abnormal NST, patient now presents to [facility] for cardiac catheterization with possible intervention if clinically indicated. CARDIOLOGIST: Dr. Forbes   COVID: vaccinated 5/2021  PHARMACY: [facility] (in [place]) and [facility]  ESCORT: Wife    Pt is 82yo M w/ PMHx of HTN, HLD, DM T2, Prostate CA (s/p radiation in 8675-6933; in remission and no active treatment in progress), and CAD (s/p PCI mLAD 2010 @ [facility]) who presented to his cardiologist, Dr. Forbes, for cardiac evaluation given history of CAD w/ non-obstructive lesions left over (report below). Pt denies CP, SOB, palpitations, syncope, presyncope, dizziness, LE edema, orthopnea, PND, N/V, fever/chills. TTE (6/25/2021): normal LV systolic fxn, Grade I diastolic dysfunction, trace MR, trace TR. NST (6/25/2021): medium sized reversible perfusion defect of moderate intensity in entire infeiror and inferolateral myocardial wall suggestive of inducible ischemic in the posterior coronary circulation. Cardiac Cath (2010): LIZ mLAD; LM normal, mLCx 60%, mRCA 40%, mRPLS 60%; LVEF 50%. In light of patient's risk factors and abnormal NST, patient now presents to [facility] for cardiac catheterization with possible intervention if clinically indicated.

## 2021-08-02 NOTE — H&P ADULT - GUM GEN PE MLT EXAM PC
Chief Complaint   Patient presents with   • Follow-Up   • Chest Pain   • HTN (Controlled)   • Hyperlipidemia   • Diabetes (Controlled)       Subjective:   Tanya Barrow is a 47 y.o. female who presents today for follow-up of continued chest pain.    Tanya is a 47 year old female with history of hypertension, hyperlipidemia and diabetes (all treated). She had been seen in the ER in September 2018 for chest pain. Previous MPI and echocardiogram in 2015 were normal.    She was last seen in November 2018, and she was doing better. BP was better.    She is here today, as she is having chest pain again. It is midsternal and radiates to her left arm. It seems to be worse with exertion, and better with rest, and associated with some mild shortness of breath; she has not taken anything for it. BP has been up again as well. No palpitations; no orthopnea or PND; no dizziness or syncope; no edema. Glucose has been fairly well controlled.    Past Medical History:   Diagnosis Date   • Anemia    • Blood clotting disorder (HCC)    • Chest pain 03/2015    MPI with no ischemia, LVEF 70%. November 2017: Echocardiogram with normal LV size, LVEF 65%. No valvular abnormalities.   • Diabetes     Oral meds   • Heart burn    • Hemorrhagic disorder (HCC)    • Hypertension 2006    Medication   • Urinary bladder disorder    • Urinary incontinence      Past Surgical History:   Procedure Laterality Date   • ANTERIOR AND POSTERIOR REPAIR  2/22/2018    Procedure: ANTERIOR AND POSTERIOR REPAIR;  Surgeon: Angel Hernadez M.D.;  Location: SURGERY SAME DAY [facility];  Service: Gynecology   • ENTEROCELE REPAIR  2/22/2018    Procedure: ENTEROCELE REPAIR;  Surgeon: Angel Hernadez M.D.;  Location: SURGERY SAME DAY [facility];  Service: Gynecology   • VAGINAL SUSPENSION N/A 2/22/2018    Procedure: VAGINAL SUSPENSION- SACROSPINOUS VAULT;  Surgeon: Angel Hernadez M.D.;  Location: SURGERY SAME DAY [facility];  Service: Gynecology    • BLADDER SLING FEMALE N/A 2/22/2018    Procedure: BLADDER SLING FEMALE- TENSION FREE VAGINAL TAPE;  Surgeon: Angel Hernadez M.D.;  Location: SURGERY SAME DAY [facility];  Service: Gynecology   • BLADDER SLING FEMALE  10/17/2017    Procedure: BLADDER SLING FEMALE - TENSION FREE TAPE PROCEDURE;  Surgeon: Angel Hernadez M.D.;  Location: SURGERY SAME DAY [facility];  Service: Gynecology   • CYSTOSCOPY N/A 10/17/2017    Procedure: CYSTOSCOPY;  Surgeon: Angel Hernadez M.D.;  Location: SURGERY SAME DAY [facility];  Service: Gynecology   • ANTERIOR AND POSTERIOR REPAIR  10/17/2017    Procedure: ANTERIOR AND POSTERIOR REPAIR W/UPHOLD MESH;  Surgeon: Angel Hernadez M.D.;  Location: SURGERY SAME DAY [facility];  Service: Gynecology   • ENTEROCELE REPAIR  10/17/2017    Procedure: ENTEROCELE REPAIR - PERINEOPLASTY;  Surgeon: Angel Hernadez M.D.;  Location: SURGERY SAME DAY [facility];  Service: Gynecology   • VAGINAL SUSPENSION  10/17/2017    Procedure: VAGINAL SUSPENSION - SACROSPINOUS VAULT;  Surgeon: Angel Hernadez M.D.;  Location: SURGERY SAME DAY [facility];  Service: Gynecology   • BLADDER SLING FEMALE  4/11/2017    Procedure: BLADDER SLING FEMALE-TOT;  Surgeon: Angel Hernadez M.D.;  Location: SURGERY SAME DAY [facility];  Service:    • ANTERIOR AND POSTERIOR REPAIR  4/11/2017    Procedure: ANTERIOR AND POSTERIOR REPAIR;  Surgeon: Angel Hernadez M.D.;  Location: SURGERY SAME DAY [facility];  Service:    • ENTEROCELE REPAIR  4/11/2017    Procedure: ENTEROCELE REPAIR- PERINEOPLASTY;  Surgeon: Angel Hernadez M.D.;  Location: SURGERY SAME DAY [facility];  Service:    • VAGINAL SUSPENSION  4/11/2017    Procedure: VAGINAL SUSPENSION-SACROSPINOUS VAULT;  Surgeon: Angel Hernadez M.D.;  Location: SURGERY SAME DAY [facility];  Service:    • BLADDER SLING FEMALE  10/25/2016    Procedure: BLADDER SLING FEMALE TOT;  Surgeon: Angel Hernadez M.D.;  Location: SURGERY SAME DAY [facility];   Service:    • VAGINAL HYSTERECTOMY SCOPE TOTAL  10/25/2016    Procedure: VAGINAL HYSTERECTOMY SCOPE TOTAL;  Surgeon: Angel Hernadez M.D.;  Location: SURGERY SAME DAY [facility];  Service:    • SALPINGECTOMY Bilateral 10/25/2016    Procedure: SALPINGECTOMY;  Surgeon: Angel Hernadez M.D.;  Location: SURGERY SAME DAY [facility];  Service:    • ANTERIOR AND POSTERIOR REPAIR  10/25/2016    Procedure: ANTERIOR AND POSTERIOR REPAIR;  Surgeon: Angel Hernadez M.D.;  Location: SURGERY SAME DAY [facility];  Service:    • ENTEROCELE REPAIR  10/25/2016    Procedure: ENTEROCELE REPAIR, PERINEOPLASTY;  Surgeon: Angel Hernadez M.D.;  Location: SURGERY SAME DAY [facility];  Service:    • VAGINAL SUSPENSION  10/25/2016    Procedure: VAGINAL SUSPENSION SACROSPINOUS VAULT ;  Surgeon: Angel Hernadez M.D.;  Location: SURGERY SAME DAY [facility];  Service:    • OTHER      Tubal ligation     Family History   Problem Relation Age of Onset   • Diabetes Mother         pills   • Hypertension Mother    • Diabetes Father         insulin   • Diabetes Paternal Grandmother    • Diabetes Paternal Aunt      Social History     Social History   • Marital status: Single     Spouse name: N/A   • Number of children: N/A   • Years of education: N/A     Occupational History   • Not on file.     Social History Main Topics   • Smoking status: Never Smoker   • Smokeless tobacco: Never Used   • Alcohol use No   • Drug use: No   • Sexual activity: Yes     Partners: Male     Other Topics Concern   • Not on file     Social History Narrative   • No narrative on file     Allergies   Allergen Reactions   • Latex      Condons, rash   • Metformin Rash     Rash       Outpatient Encounter Prescriptions as of 2/4/2019   Medication Sig Dispense Refill   • VENTOLIN  (90 Base) MCG/ACT Aero Soln inhalation aerosol INHALE 1 TO 2 PUFFS PO Q 4 TO 6 H PRF DIFFICULTY BREATHING  0   • atorvastatin (LIPITOR) 40 MG Tab      • ARNUITY ELLIPTA 100 MCG/ACT  AEROSOL POWDER, BREATH ACTIVATED INHALE 2 PUFFS PO QD  0   • glimepiride (AMARYL) 2 MG Tab TK 1 T PO QD FOR DIABETES  3   • ONE TOUCH ULTRA TEST strip TEST BLOOD SUGAR QAM  1   • ibuprofen (MOTRIN) 600 MG Tab TK 1 T PO TID  0   • Insulin Glargine (BASAGLAR KWIKPEN) 100 UNIT/ML Solution Pen-injector      • ONETOUCH DELICA LANCETS 33G Misc U UTD QAM  5   • NIFEdipine (ADALAT CC) 30 MG CR tablet TK 1 T PO QD FOR BLOOD PRESSURE  2   • amLODIPine (NORVASC) 10 MG Tab Take 1 Tab by mouth every day. 30 Tab 6   • sertraline (ZOLOFT) 50 MG Tab Take 1 Tab by mouth every day. 30 Tab 11   • losartan (COZAAR) 100 MG Tab Take 100 mg by mouth every day.     • aspirin EC (ECOTRIN) 81 MG Tablet Delayed Response Take 81 mg by mouth every morning.     • [DISCONTINUED] amLODIPine (NORVASC) 5 MG Tab TK 1 T PO QD  1   • [DISCONTINUED] amLODIPine (NORVASC) 5 MG Tab      • [DISCONTINUED] azithromycin (ZITHROMAX) 250 MG Tab   0   • [DISCONTINUED] benzonatate (TESSALON) 100 MG Cap TK 1 C PO Q 8 H PRF COUGH  0   • [DISCONTINUED] benzonatate (TESSALON) 200 MG capsule TK 1 C PO Q 8 H PRN FOR COUGH  0   • [DISCONTINUED] doxycycline (MONODOX) 100 MG capsule TK ONE C PO  BID  0   • [DISCONTINUED] losartan-hydrochlorothiazide (HYZAAR) 100-25 MG per tablet TK 1 T PO QD FOR HIGH BLOOD PRESSURE  1   • [DISCONTINUED] atorvastatin (LIPITOR) 20 MG Tab TK 1 T PO ONCE AT NIGHT FOR CHOLESTEROL  3   • [DISCONTINUED] amLODIPine (NORVASC) 5 MG Tab Take 1 Tab by mouth every day. 90 Tab 3   • [DISCONTINUED] pioglitazone (ACTOS) 30 MG Tab Take 30 mg by mouth every day.     • [DISCONTINUED] Insulin Glargine (TOUJEO MAX SOLOSTAR) 300 UNIT/ML Solution Pen-injector Inject 15 Units as instructed every day.       No facility-administered encounter medications on file as of 2/4/2019.      Review of Systems   Constitutional: Negative for chills and fever.   HENT: Negative for congestion.    Respiratory: Positive for shortness of breath. Negative for cough.   "  Cardiovascular: Positive for chest pain. Negative for palpitations, orthopnea, leg swelling and PND.   Gastrointestinal: Negative for abdominal pain and nausea.   Musculoskeletal: Negative for myalgias.   Skin: Negative for rash.   Neurological: Negative for dizziness, loss of consciousness and headaches.   Endo/Heme/Allergies: Does not bruise/bleed easily.   Psychiatric/Behavioral: The patient does not have insomnia.         Objective:   /98 (BP Location: Left arm, Patient Position: Sitting)   Pulse 78   Ht 1.626 m (5' 4\")   Wt 108.9 kg (240 lb)   LMP 10/13/2016 (Exact Date)   SpO2 94%   BMI 41.20 kg/m²     Physical Exam   Constitutional: She is oriented to person, place, and time. She appears well-developed and well-nourished.   She is obese (BMI 41.20)   HENT:   Head: Normocephalic.   Eyes: EOM are normal.   Neck: Normal range of motion. Neck supple. No JVD present.   Cardiovascular: Normal rate, regular rhythm and normal heart sounds.    Pulmonary/Chest: Effort normal and breath sounds normal. No respiratory distress. She has no wheezes. She has no rales.   Abdominal: Soft. Bowel sounds are normal. She exhibits no distension. There is no tenderness.   Musculoskeletal: Normal range of motion. She exhibits no edema.   Neurological: She is alert and oriented to person, place, and time.   Skin: Skin is warm and dry. No rash noted.   Psychiatric: She has a normal mood and affect.     EKG ordered and interpreted by me today reveals sinus rhythm at 70 bpm, with no acute ST-T wave changes.      Assessment:     1. Stable angina pectoris (HCC)  amLODIPine (NORVASC) 10 MG Tab    NM-CARDIAC STRESS TEST   2. Hypertension, unspecified type  EKG    amLODIPine (NORVASC) 10 MG Tab    NM-CARDIAC STRESS TEST    CANCELED: EKG   3. Essential hypertension, benign  amLODIPine (NORVASC) 10 MG Tab    NM-CARDIAC STRESS TEST   4. Mixed hyperlipidemia  NM-CARDIAC STRESS TEST   5. Type 2 diabetes mellitus without complication, " with long-term current use of insulin (HCC)     6. Other chest pain  NM-CARDIAC STRESS TEST       Medical Decision Making:  Today's Assessment / Status / Plan:     1. Chest pain, with multiple risk factors. Will obtain MPI , and she is agreeable.    2. Hypertension, treated, but suboptimal control. To increase Amlodipine to 10mg once daily. Monitor BP at home.    3. Hyperlipidemia, treated with Lipitor. To repeat lipid panel was next follow-up.    4. Diabetes mellitus, treated, stable.    Plan as above: increase Amlodipine to 10mg daily; monitor BP at home, and obtain MPI. To FU with me after MPI to recheck BP and review results. Will repeat labs at that time too.    Collaborating MD: Yousuf   not examined

## 2021-08-02 NOTE — H&P ADULT - ASSESSMENT
80yo M w/ PMHx of HTN, HLD, DM T2, Prostate CA (s/p radiation in 2202-7362; in remission and no active treatment in progress), and CAD (s/p PCI mLAD 2010 @ [facility]) who presented to his cardiologist, Dr. Forbes, for cardiac evaluation given history of CAD w/ non-obstructive lesions left over (report below). Pt denies CP, SOB, palpitations, syncope, presyncope, dizziness, LE edema, orthopnea, PND, N/V, fever/chills. TTE (6/25/2021): normal LV systolic fxn, Grade I diastolic dysfunction, trace MR, trace TR. NST (6/25/2021): medium sized reversible perfusion defect of moderate intensity in entire infeiror and inferolateral myocardial wall suggestive of inducible ischemic in the posterior coronary circulation. Cardiac Cath (2010): LIZ mLAD; LM normal, mLCx 60%, mRCA 40%, mRPLS 60%; LVEF 50%. In light of patient's risk factors and abnormal NST, patient now presents to [facility] for cardiac catheterization with possible intervention if clinically indicated.    -H/H = ____. Pt denies BRBPR, hematuria, hematochezia, melena. Pt loaded 325mg ASA x1 and Plavix 600mg x1  -Cr = _____. EF normal. Euvolemic on exam. IVF @ 75cc/hr started pre procedure  -Type of sedation: moderate  -Candidate for sedation: yes     Risks & benefits of procedure and alternative therapy have been explained to the patient including but not limited to: allergic reaction, bleeding w/possible need for blood transfusion, infection, renal and vascular compromise, limb damage, arrhythmia, stroke, vessel dissection/perforation, Myocardial infarction, emergent CABG. Informed consent obtained and in chart.  80yo M w/ PMHx of HTN, HLD, DM T2, Prostate CA (s/p radiation in [id number]; in remission and no active treatment in progress), and CAD (s/p PCI mLAD 2010 @ [facility]) who presented to his cardiologist, Dr. Forbes, for cardiac evaluation given history of CAD w/ non-obstructive lesions left over (report below). Pt denies CP, SOB, palpitations, syncope, presyncope, dizziness, LE edema, orthopnea, PND, N/V, fever/chills. TTE (6/25/2021): normal LV systolic fxn, Grade I diastolic dysfunction, trace MR, trace TR. NST (6/25/2021): medium sized reversible perfusion defect of moderate intensity in entire infeiror and inferolateral myocardial wall suggestive of inducible ischemic in the posterior coronary circulation. Cardiac Cath (2010): LIZ mLAD; LM normal, mLCx 60%, mRCA 40%, mRPLS 60%; LVEF 50%. In light of patient's risk factors and abnormal NST, patient now presents to [facility] for cardiac catheterization with possible intervention if clinically indicated.    -H/H = ____. Pt denies BRBPR, hematuria, hematochezia, melena. Pt compliant with home ASA 81/Plavix 75 and took home Plavix today. Will give home ASA 81mg QD  -Cr = 0.74. EF normal. Euvolemic on exam. IVF @ 75cc/hr started pre procedure  -Type of sedation: moderate  -Candidate for sedation: yes     Risks & benefits of procedure and alternative therapy have been explained to the patient including but not limited to: allergic reaction, bleeding w/possible need for blood transfusion, infection, renal and vascular compromise, limb damage, arrhythmia, stroke, vessel dissection/perforation, Myocardial infarction, emergent CABG. Informed consent obtained and in chart.  82yo M w/ PMHx of HTN, HLD, DM T2, Prostate CA (s/p radiation in 5343-1491; in remission and no active treatment in progress), and CAD (s/p PCI mLAD 2010 @ [facility]) who presented to his cardiologist, Dr. Forbes, for cardiac evaluation given history of CAD w/ non-obstructive lesions left over (report below). Pt denies CP, SOB, palpitations, syncope, presyncope, dizziness, LE edema, orthopnea, PND, N/V, fever/chills. TTE (6/25/2021): normal LV systolic fxn, Grade I diastolic dysfunction, trace MR, trace TR. NST (6/25/2021): medium sized reversible perfusion defect of moderate intensity in entire infeiror and inferolateral myocardial wall suggestive of inducible ischemic in the posterior coronary circulation. Cardiac Cath (2010): LIZ mLAD; LM normal, mLCx 60%, mRCA 40%, mRPLS 60%; LVEF 50%. In light of patient's risk factors and abnormal NST, patient now presents to [facility] for cardiac catheterization with possible intervention if clinically indicated.    -H/H = 14.5/41.1. Pt denies BRBPR, hematuria, hematochezia, melena. Pt compliant with home ASA 81/Plavix 75 and took home Plavix today. Will give home ASA 81mg QD  -Cr = 0.74. EF normal. Euvolemic on exam. IVF @ 75cc/hr started pre procedure  -Type of sedation: moderate  -Candidate for sedation: yes     Risks & benefits of procedure and alternative therapy have been explained to the patient including but not limited to: allergic reaction, bleeding w/possible need for blood transfusion, infection, renal and vascular compromise, limb damage, arrhythmia, stroke, vessel dissection/perforation, Myocardial infarction, emergent CABG. Informed consent obtained and in chart.

## 2021-08-06 ENCOUNTER — INPATIENT (INPATIENT)
Facility: HOSPITAL | Age: 81
LOS: 0 days | Discharge: HOME | DRG: 247 | End: 2021-08-07
Attending: INTERNAL MEDICINE | Admitting: INTERNAL MEDICINE
Payer: COMMERCIAL

## 2021-08-06 ENCOUNTER — TRANSCRIPTION ENCOUNTER (OUTPATIENT)
Age: 81
End: 2021-08-06

## 2021-08-06 LAB
A1C WITH ESTIMATED AVERAGE GLUCOSE RESULT: 6.1 % — HIGH (ref 4–5.6)
ALBUMIN SERPL ELPH-MCNC: 4.8 G/DL — SIGNIFICANT CHANGE UP (ref 3.3–5)
ALP SERPL-CCNC: 102 U/L — SIGNIFICANT CHANGE UP (ref 40–120)
ALT FLD-CCNC: 13 U/L — SIGNIFICANT CHANGE UP (ref 10–45)
ANION GAP SERPL CALC-SCNC: 10 MMOL/L — SIGNIFICANT CHANGE UP (ref 5–17)
APTT BLD: 31.3 SEC — SIGNIFICANT CHANGE UP (ref 27.5–35.5)
AST SERPL-CCNC: 18 U/L — SIGNIFICANT CHANGE UP (ref 10–40)
BASOPHILS # BLD AUTO: 0.04 K/UL — SIGNIFICANT CHANGE UP (ref 0–0.2)
BASOPHILS NFR BLD AUTO: 0.9 % — SIGNIFICANT CHANGE UP (ref 0–2)
BILIRUB SERPL-MCNC: 0.4 MG/DL — SIGNIFICANT CHANGE UP (ref 0.2–1.2)
BUN SERPL-MCNC: 10 MG/DL — SIGNIFICANT CHANGE UP (ref 7–23)
CALCIUM SERPL-MCNC: 9.7 MG/DL — SIGNIFICANT CHANGE UP (ref 8.4–10.5)
CHLORIDE SERPL-SCNC: 97 MMOL/L — SIGNIFICANT CHANGE UP (ref 96–108)
CHOLEST SERPL-MCNC: 176 MG/DL — SIGNIFICANT CHANGE UP
CK MB CFR SERPL CALC: 1.7 NG/ML — SIGNIFICANT CHANGE UP (ref 0–6.7)
CK SERPL-CCNC: 76 U/L — SIGNIFICANT CHANGE UP (ref 30–200)
CO2 SERPL-SCNC: 27 MMOL/L — SIGNIFICANT CHANGE UP (ref 22–31)
CREAT SERPL-MCNC: 0.74 MG/DL — SIGNIFICANT CHANGE UP (ref 0.5–1.3)
EOSINOPHIL # BLD AUTO: 0.17 K/UL — SIGNIFICANT CHANGE UP (ref 0–0.5)
EOSINOPHIL NFR BLD AUTO: 3.8 % — SIGNIFICANT CHANGE UP (ref 0–6)
ESTIMATED AVERAGE GLUCOSE: 128 MG/DL — HIGH (ref 68–114)
GLUCOSE BLDC GLUCOMTR-MCNC: 117 MG/DL — HIGH (ref 70–99)
GLUCOSE BLDC GLUCOMTR-MCNC: 121 MG/DL — HIGH (ref 70–99)
GLUCOSE BLDC GLUCOMTR-MCNC: 126 MG/DL — HIGH (ref 70–99)
GLUCOSE BLDC GLUCOMTR-MCNC: 134 MG/DL — HIGH (ref 70–99)
GLUCOSE BLDC GLUCOMTR-MCNC: 140 MG/DL — HIGH (ref 70–99)
GLUCOSE SERPL-MCNC: 124 MG/DL — HIGH (ref 70–99)
HCT VFR BLD CALC: 41.1 % — SIGNIFICANT CHANGE UP (ref 39–50)
HDLC SERPL-MCNC: 49 MG/DL — SIGNIFICANT CHANGE UP
HGB BLD-MCNC: 14.5 G/DL — SIGNIFICANT CHANGE UP (ref 13–17)
IMM GRANULOCYTES NFR BLD AUTO: 0.2 % — SIGNIFICANT CHANGE UP (ref 0–1.5)
INR BLD: 0.98 — SIGNIFICANT CHANGE UP (ref 0.88–1.16)
LIPID PNL WITH DIRECT LDL SERPL: 101 MG/DL — HIGH
LYMPHOCYTES # BLD AUTO: 0.86 K/UL — LOW (ref 1–3.3)
LYMPHOCYTES # BLD AUTO: 19.4 % — SIGNIFICANT CHANGE UP (ref 13–44)
MCHC RBC-ENTMCNC: 30.9 PG — SIGNIFICANT CHANGE UP (ref 27–34)
MCHC RBC-ENTMCNC: 35.3 GM/DL — SIGNIFICANT CHANGE UP (ref 32–36)
MCV RBC AUTO: 87.4 FL — SIGNIFICANT CHANGE UP (ref 80–100)
MONOCYTES # BLD AUTO: 0.56 K/UL — SIGNIFICANT CHANGE UP (ref 0–0.9)
MONOCYTES NFR BLD AUTO: 12.6 % — SIGNIFICANT CHANGE UP (ref 2–14)
NEUTROPHILS # BLD AUTO: 2.8 K/UL — SIGNIFICANT CHANGE UP (ref 1.8–7.4)
NEUTROPHILS NFR BLD AUTO: 63.1 % — SIGNIFICANT CHANGE UP (ref 43–77)
NON HDL CHOLESTEROL: 127 MG/DL — SIGNIFICANT CHANGE UP
NRBC # BLD: 0 /100 WBCS — SIGNIFICANT CHANGE UP (ref 0–0)
PLATELET # BLD AUTO: 200 K/UL — SIGNIFICANT CHANGE UP (ref 150–400)
POTASSIUM SERPL-MCNC: 4.4 MMOL/L — SIGNIFICANT CHANGE UP (ref 3.5–5.3)
POTASSIUM SERPL-SCNC: 4.4 MMOL/L — SIGNIFICANT CHANGE UP (ref 3.5–5.3)
PROT SERPL-MCNC: 7.2 G/DL — SIGNIFICANT CHANGE UP (ref 6–8.3)
PROTHROM AB SERPL-ACNC: 11.8 SEC — SIGNIFICANT CHANGE UP (ref 10.6–13.6)
RBC # BLD: 4.7 M/UL — SIGNIFICANT CHANGE UP (ref 4.2–5.8)
RBC # FLD: 12.3 % — SIGNIFICANT CHANGE UP (ref 10.3–14.5)
SODIUM SERPL-SCNC: 134 MMOL/L — LOW (ref 135–145)
TRIGL SERPL-MCNC: 132 MG/DL — SIGNIFICANT CHANGE UP
WBC # BLD: 4.44 K/UL — SIGNIFICANT CHANGE UP (ref 3.8–10.5)
WBC # FLD AUTO: 4.44 K/UL — SIGNIFICANT CHANGE UP (ref 3.8–10.5)

## 2021-08-06 PROCEDURE — 99152 MOD SED SAME PHYS/QHP 5/>YRS: CPT

## 2021-08-06 PROCEDURE — 92928 PRQ TCAT PLMT NTRAC ST 1 LES: CPT | Mod: RC

## 2021-08-06 PROCEDURE — 93458 L HRT ARTERY/VENTRICLE ANGIO: CPT | Mod: 26,XU

## 2021-08-06 PROCEDURE — 93010 ELECTROCARDIOGRAM REPORT: CPT | Mod: 76

## 2021-08-06 RX ORDER — ASCORBIC ACID 60 MG
500 TABLET,CHEWABLE ORAL DAILY
Refills: 0 | Status: DISCONTINUED | OUTPATIENT
Start: 2021-08-06 | End: 2021-08-07

## 2021-08-06 RX ORDER — LOSARTAN POTASSIUM 100 MG/1
0.5 TABLET, FILM COATED ORAL
Qty: 0 | Refills: 0 | DISCHARGE

## 2021-08-06 RX ORDER — SODIUM CHLORIDE 9 MG/ML
1000 INJECTION, SOLUTION INTRAVENOUS
Refills: 0 | Status: DISCONTINUED | OUTPATIENT
Start: 2021-08-06 | End: 2021-08-07

## 2021-08-06 RX ORDER — PANTOPRAZOLE SODIUM 20 MG/1
40 TABLET, DELAYED RELEASE ORAL
Refills: 0 | Status: DISCONTINUED | OUTPATIENT
Start: 2021-08-06 | End: 2021-08-07

## 2021-08-06 RX ORDER — SODIUM CHLORIDE 9 MG/ML
500 INJECTION INTRAMUSCULAR; INTRAVENOUS; SUBCUTANEOUS
Refills: 0 | Status: DISCONTINUED | OUTPATIENT
Start: 2021-08-06 | End: 2021-08-06

## 2021-08-06 RX ORDER — ASPIRIN/CALCIUM CARB/MAGNESIUM 324 MG
1 TABLET ORAL
Qty: 0 | Refills: 0 | DISCHARGE

## 2021-08-06 RX ORDER — DEXTROSE 50 % IN WATER 50 %
25 SYRINGE (ML) INTRAVENOUS ONCE
Refills: 0 | Status: DISCONTINUED | OUTPATIENT
Start: 2021-08-06 | End: 2021-08-07

## 2021-08-06 RX ORDER — CLOPIDOGREL BISULFATE 75 MG/1
75 TABLET, FILM COATED ORAL DAILY
Refills: 0 | Status: DISCONTINUED | OUTPATIENT
Start: 2021-08-07 | End: 2021-08-07

## 2021-08-06 RX ORDER — DEXTROSE 50 % IN WATER 50 %
12.5 SYRINGE (ML) INTRAVENOUS ONCE
Refills: 0 | Status: DISCONTINUED | OUTPATIENT
Start: 2021-08-06 | End: 2021-08-07

## 2021-08-06 RX ORDER — GLUCAGON INJECTION, SOLUTION 0.5 MG/.1ML
1 INJECTION, SOLUTION SUBCUTANEOUS ONCE
Refills: 0 | Status: DISCONTINUED | OUTPATIENT
Start: 2021-08-06 | End: 2021-08-07

## 2021-08-06 RX ORDER — PYRIDOXINE HCL (VITAMIN B6) 100 MG
50 TABLET ORAL DAILY
Refills: 0 | Status: DISCONTINUED | OUTPATIENT
Start: 2021-08-06 | End: 2021-08-07

## 2021-08-06 RX ORDER — LOSARTAN POTASSIUM 100 MG/1
25 TABLET, FILM COATED ORAL DAILY
Refills: 0 | Status: DISCONTINUED | OUTPATIENT
Start: 2021-08-07 | End: 2021-08-07

## 2021-08-06 RX ORDER — METFORMIN HYDROCHLORIDE 850 MG/1
0 TABLET ORAL
Qty: 0 | Refills: 0 | DISCHARGE

## 2021-08-06 RX ORDER — ATORVASTATIN CALCIUM 80 MG/1
80 TABLET, FILM COATED ORAL AT BEDTIME
Refills: 0 | Status: DISCONTINUED | OUTPATIENT
Start: 2021-08-06 | End: 2021-08-07

## 2021-08-06 RX ORDER — METOPROLOL TARTRATE 50 MG
25 TABLET ORAL DAILY
Refills: 0 | Status: DISCONTINUED | OUTPATIENT
Start: 2021-08-07 | End: 2021-08-07

## 2021-08-06 RX ORDER — TAMSULOSIN HYDROCHLORIDE 0.4 MG/1
0.4 CAPSULE ORAL AT BEDTIME
Refills: 0 | Status: DISCONTINUED | OUTPATIENT
Start: 2021-08-06 | End: 2021-08-07

## 2021-08-06 RX ORDER — LOVASTATIN 20 MG
1 TABLET ORAL
Qty: 0 | Refills: 0 | DISCHARGE

## 2021-08-06 RX ORDER — INSULIN LISPRO 100/ML
VIAL (ML) SUBCUTANEOUS
Refills: 0 | Status: DISCONTINUED | OUTPATIENT
Start: 2021-08-06 | End: 2021-08-07

## 2021-08-06 RX ORDER — ASPIRIN/CALCIUM CARB/MAGNESIUM 324 MG
81 TABLET ORAL DAILY
Refills: 0 | Status: DISCONTINUED | OUTPATIENT
Start: 2021-08-07 | End: 2021-08-07

## 2021-08-06 RX ORDER — SODIUM CHLORIDE 9 MG/ML
500 INJECTION INTRAMUSCULAR; INTRAVENOUS; SUBCUTANEOUS
Refills: 0 | Status: DISCONTINUED | OUTPATIENT
Start: 2021-08-06 | End: 2021-08-07

## 2021-08-06 RX ORDER — DEXTROSE 50 % IN WATER 50 %
15 SYRINGE (ML) INTRAVENOUS ONCE
Refills: 0 | Status: DISCONTINUED | OUTPATIENT
Start: 2021-08-06 | End: 2021-08-07

## 2021-08-06 RX ORDER — ASPIRIN/CALCIUM CARB/MAGNESIUM 324 MG
81 TABLET ORAL ONCE
Refills: 0 | Status: COMPLETED | OUTPATIENT
Start: 2021-08-06 | End: 2021-08-06

## 2021-08-06 RX ORDER — CLOPIDOGREL BISULFATE 75 MG/1
1 TABLET, FILM COATED ORAL
Qty: 0 | Refills: 0 | DISCHARGE

## 2021-08-06 RX ADMIN — TAMSULOSIN HYDROCHLORIDE 0.4 MILLIGRAM(S): 0.4 CAPSULE ORAL at 21:45

## 2021-08-06 RX ADMIN — SODIUM CHLORIDE 100 MILLILITER(S): 9 INJECTION INTRAMUSCULAR; INTRAVENOUS; SUBCUTANEOUS at 13:54

## 2021-08-06 RX ADMIN — Medication 81 MILLIGRAM(S): at 08:28

## 2021-08-06 RX ADMIN — ATORVASTATIN CALCIUM 80 MILLIGRAM(S): 80 TABLET, FILM COATED ORAL at 21:45

## 2021-08-06 RX ADMIN — SODIUM CHLORIDE 75 MILLILITER(S): 9 INJECTION INTRAMUSCULAR; INTRAVENOUS; SUBCUTANEOUS at 08:29

## 2021-08-06 NOTE — PROGRESS NOTE ADULT - SUBJECTIVE AND OBJECTIVE BOX
PROCEDURE: CORONARY ANGIOGRAM, LHC, LVGRAM, ROTATIONAL ATHERECTOMY, PCI  INDICATION: UNSTABLE ANGINA/POSITIVE STRESS TEST  ATTENDING: DR. MARTIN FORBES MD   ACCESS: RRA/6F RFA (s/p perclose), 6F RFV ( sutured)     FINDINGS   mRCA= 90% severely calcific   dRCA=80%   LM= 20% distal  pLAD= 70% severely calcific  mLAD= patent stent, 80% distal to stent  pLCx= moderate   dLCx= severe diffuse    LVEF:55 %  LVEDP: 8 mmHg    PROCEDURE:   Successful rotational atherectomy with 1.5 marie and PCI of mid and distal RCA  with LIZ x 2 (Synergy 2.75 x 24 and Synergy 3.5 x 38) with excellent angiographic result     A/P  -aspirin 81 mg daily indefinitely  -clopidogrel 75 mg daily for at least one year  -atorvastatin 80 mg daily  -plan for staged PCI of LAD in 5 weeks   -please schedule outpatient follow up with Dr. Forbes in a week in [facility] ( tel: [phone])  
Interventional Cardiology PA Sheath Pull Note    Pt without complaints. VSS      Pre-Sheath Removal:    [x ] Right     [ ] Left          [x ] Groin    [ ] Brachial    [ ] 5Fr      [x ] 6Fr    [ ] 7Fr     [ ] 8Fr    [ ] Arterial  [x ] Venous sheath in place    [no ] Hematoma        [no ] Bleed      Hemostasis Achieved with:         20        minutes manual pressure    Vasovagal Reaction: No    Meds Given:  none    Post-Sheath Removal:    [x ] Right     [ ] Left          [ x] Groin    [ ] Brachial    [no ] Hematoma        [ no] Bleed       [ no] Bruit    A/P:  s/p [ ] Dx Cath      [ ] IVUS/FFR     [ ] PTA/STENT       [x ] PTCA/STENT    -Continue bedrest (pt given instructions)  -Continue to monitor

## 2021-08-06 NOTE — PATIENT PROFILE ADULT - STATED REASON FOR ADMISSION
Referred To Oculoplastics For Closure Text (Leave Blank If You Do Not Want): After obtaining clear surgical margins the patient was sent to oculoplastics for surgical repair.  The patient understands they will receive post-surgical care and follow-up from the referring physician's office. procedure

## 2021-08-07 ENCOUNTER — TRANSCRIPTION ENCOUNTER (OUTPATIENT)
Age: 81
End: 2021-08-07

## 2021-08-07 VITALS — TEMPERATURE: 98 F

## 2021-08-07 LAB
ANION GAP SERPL CALC-SCNC: 9 MMOL/L — SIGNIFICANT CHANGE UP (ref 5–17)
BASOPHILS # BLD AUTO: 0.03 K/UL — SIGNIFICANT CHANGE UP (ref 0–0.2)
BASOPHILS NFR BLD AUTO: 0.4 % — SIGNIFICANT CHANGE UP (ref 0–2)
BUN SERPL-MCNC: 12 MG/DL — SIGNIFICANT CHANGE UP (ref 7–23)
CALCIUM SERPL-MCNC: 9.3 MG/DL — SIGNIFICANT CHANGE UP (ref 8.4–10.5)
CHLORIDE SERPL-SCNC: 101 MMOL/L — SIGNIFICANT CHANGE UP (ref 96–108)
CO2 SERPL-SCNC: 26 MMOL/L — SIGNIFICANT CHANGE UP (ref 22–31)
COVID-19 SPIKE DOMAIN AB INTERP: POSITIVE
COVID-19 SPIKE DOMAIN ANTIBODY RESULT: 133 U/ML — HIGH
CREAT SERPL-MCNC: 0.73 MG/DL — SIGNIFICANT CHANGE UP (ref 0.5–1.3)
EOSINOPHIL # BLD AUTO: 0.12 K/UL — SIGNIFICANT CHANGE UP (ref 0–0.5)
EOSINOPHIL NFR BLD AUTO: 1.8 % — SIGNIFICANT CHANGE UP (ref 0–6)
GLUCOSE BLDC GLUCOMTR-MCNC: 120 MG/DL — HIGH (ref 70–99)
GLUCOSE BLDC GLUCOMTR-MCNC: 127 MG/DL — HIGH (ref 70–99)
GLUCOSE SERPL-MCNC: 135 MG/DL — HIGH (ref 70–99)
HCT VFR BLD CALC: 41.8 % — SIGNIFICANT CHANGE UP (ref 39–50)
HGB BLD-MCNC: 14.1 G/DL — SIGNIFICANT CHANGE UP (ref 13–17)
IMM GRANULOCYTES NFR BLD AUTO: 0.1 % — SIGNIFICANT CHANGE UP (ref 0–1.5)
LYMPHOCYTES # BLD AUTO: 0.68 K/UL — LOW (ref 1–3.3)
LYMPHOCYTES # BLD AUTO: 10.2 % — LOW (ref 13–44)
MAGNESIUM SERPL-MCNC: 2.1 MG/DL — SIGNIFICANT CHANGE UP (ref 1.6–2.6)
MCHC RBC-ENTMCNC: 30.4 PG — SIGNIFICANT CHANGE UP (ref 27–34)
MCHC RBC-ENTMCNC: 33.7 GM/DL — SIGNIFICANT CHANGE UP (ref 32–36)
MCV RBC AUTO: 90.1 FL — SIGNIFICANT CHANGE UP (ref 80–100)
MONOCYTES # BLD AUTO: 0.7 K/UL — SIGNIFICANT CHANGE UP (ref 0–0.9)
MONOCYTES NFR BLD AUTO: 10.5 % — SIGNIFICANT CHANGE UP (ref 2–14)
NEUTROPHILS # BLD AUTO: 5.15 K/UL — SIGNIFICANT CHANGE UP (ref 1.8–7.4)
NEUTROPHILS NFR BLD AUTO: 77 % — SIGNIFICANT CHANGE UP (ref 43–77)
NRBC # BLD: 0 /100 WBCS — SIGNIFICANT CHANGE UP (ref 0–0)
PLATELET # BLD AUTO: 186 K/UL — SIGNIFICANT CHANGE UP (ref 150–400)
POTASSIUM SERPL-MCNC: 4.1 MMOL/L — SIGNIFICANT CHANGE UP (ref 3.5–5.3)
POTASSIUM SERPL-SCNC: 4.1 MMOL/L — SIGNIFICANT CHANGE UP (ref 3.5–5.3)
RBC # BLD: 4.64 M/UL — SIGNIFICANT CHANGE UP (ref 4.2–5.8)
RBC # FLD: 12.5 % — SIGNIFICANT CHANGE UP (ref 10.3–14.5)
SARS-COV-2 IGG+IGM SERPL QL IA: 133 U/ML — HIGH
SARS-COV-2 IGG+IGM SERPL QL IA: POSITIVE
SODIUM SERPL-SCNC: 136 MMOL/L — SIGNIFICANT CHANGE UP (ref 135–145)
WBC # BLD: 6.69 K/UL — SIGNIFICANT CHANGE UP (ref 3.8–10.5)
WBC # FLD AUTO: 6.69 K/UL — SIGNIFICANT CHANGE UP (ref 3.8–10.5)

## 2021-08-07 PROCEDURE — C1887: CPT

## 2021-08-07 PROCEDURE — 80048 BASIC METABOLIC PNL TOTAL CA: CPT

## 2021-08-07 PROCEDURE — 86769 SARS-COV-2 COVID-19 ANTIBODY: CPT

## 2021-08-07 PROCEDURE — 93458 L HRT ARTERY/VENTRICLE ANGIO: CPT

## 2021-08-07 PROCEDURE — C1725: CPT

## 2021-08-07 PROCEDURE — C1760: CPT

## 2021-08-07 PROCEDURE — 82553 CREATINE MB FRACTION: CPT

## 2021-08-07 PROCEDURE — C1894: CPT

## 2021-08-07 PROCEDURE — 85610 PROTHROMBIN TIME: CPT

## 2021-08-07 PROCEDURE — 83036 HEMOGLOBIN GLYCOSYLATED A1C: CPT

## 2021-08-07 PROCEDURE — C1769: CPT

## 2021-08-07 PROCEDURE — 82550 ASSAY OF CK (CPK): CPT

## 2021-08-07 PROCEDURE — 85730 THROMBOPLASTIN TIME PARTIAL: CPT

## 2021-08-07 PROCEDURE — C1889: CPT

## 2021-08-07 PROCEDURE — 82962 GLUCOSE BLOOD TEST: CPT

## 2021-08-07 PROCEDURE — 99232 SBSQ HOSP IP/OBS MODERATE 35: CPT

## 2021-08-07 PROCEDURE — 80061 LIPID PANEL: CPT

## 2021-08-07 PROCEDURE — 85025 COMPLETE CBC W/AUTO DIFF WBC: CPT

## 2021-08-07 PROCEDURE — 93005 ELECTROCARDIOGRAM TRACING: CPT

## 2021-08-07 PROCEDURE — 83735 ASSAY OF MAGNESIUM: CPT

## 2021-08-07 PROCEDURE — C1724: CPT

## 2021-08-07 PROCEDURE — 80053 COMPREHEN METABOLIC PANEL: CPT

## 2021-08-07 PROCEDURE — 36415 COLL VENOUS BLD VENIPUNCTURE: CPT

## 2021-08-07 PROCEDURE — 92933 PRQ TRLML C ATHRC ST ANGIOP1: CPT

## 2021-08-07 PROCEDURE — C1874: CPT

## 2021-08-07 RX ORDER — CLOPIDOGREL BISULFATE 75 MG/1
1 TABLET, FILM COATED ORAL
Qty: 30 | Refills: 0
Start: 2021-08-07 | End: 2021-09-05

## 2021-08-07 RX ORDER — METFORMIN HYDROCHLORIDE 850 MG/1
1 TABLET ORAL
Qty: 0 | Refills: 0 | DISCHARGE

## 2021-08-07 RX ORDER — LOVASTATIN 20 MG
1 TABLET ORAL
Qty: 0 | Refills: 0 | DISCHARGE

## 2021-08-07 RX ORDER — ASPIRIN/CALCIUM CARB/MAGNESIUM 324 MG
1 TABLET ORAL
Qty: 30 | Refills: 0
Start: 2021-08-07 | End: 2021-09-05

## 2021-08-07 RX ORDER — ASPIRIN/CALCIUM CARB/MAGNESIUM 324 MG
1 TABLET ORAL
Qty: 30 | Refills: 11
Start: 2021-08-07 | End: 2022-08-01

## 2021-08-07 RX ORDER — ATORVASTATIN CALCIUM 80 MG/1
1 TABLET, FILM COATED ORAL
Qty: 30 | Refills: 0
Start: 2021-08-07 | End: 2021-09-05

## 2021-08-07 RX ORDER — CLOPIDOGREL BISULFATE 75 MG/1
1 TABLET, FILM COATED ORAL
Qty: 0 | Refills: 0 | DISCHARGE

## 2021-08-07 RX ORDER — CLOPIDOGREL BISULFATE 75 MG/1
1 TABLET, FILM COATED ORAL
Qty: 30 | Refills: 11
Start: 2021-08-07 | End: 2022-08-01

## 2021-08-07 RX ORDER — ATORVASTATIN CALCIUM 80 MG/1
1 TABLET, FILM COATED ORAL
Qty: 30 | Refills: 3
Start: 2021-08-07 | End: 2021-12-04

## 2021-08-07 RX ORDER — ASPIRIN/CALCIUM CARB/MAGNESIUM 324 MG
1 TABLET ORAL
Qty: 0 | Refills: 0 | DISCHARGE

## 2021-08-07 RX ADMIN — LOSARTAN POTASSIUM 25 MILLIGRAM(S): 100 TABLET, FILM COATED ORAL at 12:47

## 2021-08-07 RX ADMIN — Medication 25 MILLIGRAM(S): at 06:46

## 2021-08-07 RX ADMIN — PANTOPRAZOLE SODIUM 40 MILLIGRAM(S): 20 TABLET, DELAYED RELEASE ORAL at 06:46

## 2021-08-07 RX ADMIN — Medication 500 MILLIGRAM(S): at 11:50

## 2021-08-07 RX ADMIN — Medication 50 MILLIGRAM(S): at 11:50

## 2021-08-07 RX ADMIN — CLOPIDOGREL BISULFATE 75 MILLIGRAM(S): 75 TABLET, FILM COATED ORAL at 11:50

## 2021-08-07 RX ADMIN — Medication 81 MILLIGRAM(S): at 11:50

## 2021-08-07 NOTE — DISCHARGE NOTE PROVIDER - NSDCMRMEDTOKEN_GEN_ALL_CORE_FT
Aspirin Enteric Coated 81 mg oral delayed release tablet: 1 tab(s) orally once a day  losartan 25 mg oral tablet: 0.5 tab(s) orally once a day  lovastatin 10 mg oral tablet: 1 tab(s) orally once a day  metFORMIN 500 mg oral tablet: 1 tab(s) orally once a day  metoprolol succinate 25 mg oral tablet, extended release: 0.5 tab(s) orally once a day  omeprazole 40 mg oral delayed release capsule: 1 cap(s) orally 2 times a day  Plavix 75 mg oral tablet: 1 tab(s) orally once a day  tamsulosin 0.4 mg oral capsule: 1 cap(s) orally once a day  Vitamin B6 50 mg oral tablet: 1 tab(s) orally once a day  Vitamin C 500 mg oral tablet: 1 tab(s) orally once a day   Aspirin Enteric Coated 81 mg oral delayed release tablet: 1 tab(s) orally once a day  atorvastatin 80 mg oral tablet: 1 tab(s) orally once a day (at bedtime)  atorvastatin 80 mg oral tablet: 1 tab(s) orally once a day (at bedtime)   Ecotrin Adult Low Strength 81 mg oral delayed release tablet: 1 tab(s) orally once a day   losartan 25 mg oral tablet: 0.5 tab(s) orally once a day  metFORMIN 500 mg oral tablet: 1 tab(s) orally once a day HOLD FOR 2 DAYS RESTART 8/9  metoprolol succinate 25 mg oral tablet, extended release: 0.5 tab(s) orally once a day  omeprazole 40 mg oral delayed release capsule: 1 cap(s) orally 2 times a day  Plavix 75 mg oral tablet: 1 tab(s) orally once a day  Plavix 75 mg oral tablet: 1 tab(s) orally once a day   tamsulosin 0.4 mg oral capsule: 1 cap(s) orally once a day  Vitamin B6 50 mg oral tablet: 1 tab(s) orally once a day  Vitamin C 500 mg oral tablet: 1 tab(s) orally once a day   Aspirin Enteric Coated 81 mg oral delayed release tablet: 1 tab(s) orally once a day  atorvastatin 80 mg oral tablet: 1 tab(s) orally once a day (at bedtime)  atorvastatin 80 mg oral tablet: 1 tab(s) orally once a day (at bedtime)   cardiac rehab : Cardiac Rehab 3x per week for 12 weeks for diagnosis of unstable angina s/p PCI   Ecotrin Adult Low Strength 81 mg oral delayed release tablet: 1 tab(s) orally once a day   losartan 25 mg oral tablet: 0.5 tab(s) orally once a day  metFORMIN 500 mg oral tablet: 1 tab(s) orally once a day HOLD FOR 2 DAYS RESTART 8/9  metoprolol succinate 25 mg oral tablet, extended release: 0.5 tab(s) orally once a day  omeprazole 40 mg oral delayed release capsule: 1 cap(s) orally 2 times a day  Plavix 75 mg oral tablet: 1 tab(s) orally once a day  Plavix 75 mg oral tablet: 1 tab(s) orally once a day   tamsulosin 0.4 mg oral capsule: 1 cap(s) orally once a day  Vitamin B6 50 mg oral tablet: 1 tab(s) orally once a day  Vitamin C 500 mg oral tablet: 1 tab(s) orally once a day

## 2021-08-07 NOTE — DISCHARGE NOTE NURSING/CASE MANAGEMENT/SOCIAL WORK - PATIENT PORTAL LINK FT
You can access the FollowMyHealth Patient Portal offered by [facility] by registering at the following website: [email or web address]. By joining [employer]’s FollowMyHealth portal, you will also be able to view your health information using other applications (apps) compatible with our system.

## 2021-08-07 NOTE — DISCHARGE NOTE NURSING/CASE MANAGEMENT/SOCIAL WORK - HISTORY OF COVID-19 VACCINATION
5/8/2019 6:28 PM 
 
Patient:  Elia Rosas YOB: 1958 Date of Visit: 5/6/2019 Dear MD Ruba Hickscody [id number] [facility] [address] VIA In Basket 
 : Thank you for referring Mr. Phi Cameron to me for evaluation/treatment. Below are the relevant portions of my assessment and plan of care. If you have questions, please do not hesitate to call me. I look forward to following Mr. Thomas along with you. Sincerely, Daniela Reyna MD/MPH Pulmonary, Critical Care Medicine [facility]
Yes

## 2021-08-07 NOTE — DISCHARGE NOTE PROVIDER - HOSPITAL COURSE
80yo M w/ PMHx of HTN, HLD, DM T2, Prostate CA (s/p radiation in 6979-8441; in remission), and CAD (s/p PCI mLAD 2010 @ [facility]) who presented to his cardiologist, Dr. Forbes, for cardiac evaluation given history of CAD w/ non-obstructive lesions. Pt denies CP, SOB, palpitations, syncope, presyncope, dizziness, LE edema, orthopnea, PND, N/V, fever/chills. TTE (6/25/2021): normal LV systolic fxn, Grade I diastolic dysfunction, trace MR, trace TR. NST (6/25/2021): medium sized reversible perfusion defect of moderate intensity in entire inferior and inferolateral myocardial wall suggestive of inducible ischemic in the posterior coronary circulation. In light of patient's risk factors and abnormal NST, patient referred to [facility] for cardiac catheterization. Patient now s/p cardiac cath 8/6/21 with Rota/LIZ dRCA (80%), LIZ mRCA (90%, severely calcified). Findings: dLM 20%, pLAD 70% calcified, mLAD patent stent, 80% distal to stent, pLCx dominant, dLCx diffuse disease, EF 55%, EDP 8mmHg, R radial TR , RFA PC, RFV. Intraprocedure pt vagaled, BP 73/47, HR 50's, s/p total 600mcg of Larry and 500 cc bolus and insertion of TVP which was removed.    Patient admitted to [facility] for monitoring post procedure. Patient currently asymptomatic, VSS, right radial and right groin access site soft, NT, no hematoma, radial pulse and distal DP/PT pulses at baseline. Labs/telemetry reviewed. Home Lovastatin 10mg changed to Atorvastatin 80mg PO QD per Dr. Forbes.  Patient recommended to return for staged PCI of LAD in 5 weeks. Patient deemed stable for discharge as per Dr. Hackett and to follow-up with Dr. Forbes in a week at the [place] office ([phone]). All need prescriptions have been e-prescribed to patients preferred outpatient pharmacy.       Cardiac Rehab (STEMI/NSTEMI/ACS/Unstable Angina/CHF/Chronic Stable Angina/Heart Surgery (CABG, Valve)/Post PCI):            *Education on benefits of Cardiac Rehab provided to patient: Yes/No         *Referral and Prescription Given for Cardiac Rehab : Yes/No.  If No, Why Not?         *Pt given list of locations & instructed to contact their insurance company to review list of participating providers

## 2021-08-07 NOTE — DISCHARGE NOTE PROVIDER - CARE PROVIDER_API CALL
Homero Forbes)  Cardiovascular Disease; Internal Medicine; Interventional Cardiology  [address] [place] [address], [address] [place] [address]  Phone: [phone]  Fax: [phone]  Follow Up Time: 1 week

## 2021-08-07 NOTE — DISCHARGE NOTE PROVIDER - NSDCCPCAREPLAN_GEN_ALL_CORE_FT
PRINCIPAL DISCHARGE DIAGNOSIS  Diagnosis: CAD (coronary artery disease)  Assessment and Plan of Treatment: You underwent successful percutaneous coronary intervention with drug eluding stent placement in your mid and distal right coronary artery.  --You also have a blockage in your left anterior descending artery for which you should return for stent placement in 5 weeks.  --Continue Aspirin 81mg by mouth daily and Plavix 75mg by mouth daily.  --DO NOT STOP TAKING ASPIRIN OR PLAVIX UNLESS INSTRUCTED BY YOUR CARDIOLOGIST TO PREVENT STENT CLOSURE/HEART ATTACK.   ***We have provided you with a prescription for cardiac rehab which is medically supervised exercise program for your heart and has been shown to improve the quantity and quality of life of people with heart disease like yours.   ***You should attend cardiac rehab 3 times per week for 12 weeks. We have provided you with a list of nearby facilities.   ***Please call your insurance carrier to determine which of these facilities are covered under your plan.   ***Please bring this prescription with you to your follow up appointment with your cardiologist who can then further assist you to enroll into a cardiac rehab program.      SECONDARY DISCHARGE DIAGNOSES  Diagnosis: Hypertension  Assessment and Plan of Treatment: Continue home medication: Losartan 25mg by mouth daily and Metoprolol Succinate 25mg 1/2 tab by mouth daily    Diagnosis: Hyperlipidemia  Assessment and Plan of Treatment: Your cholesterol medication has been changed. Please STOP taking Lovastatin at home, started Atorvastatin 80mg by mouth at bedtime    Diagnosis: Diabetes  Assessment and Plan of Treatment: If you are a diabetic and you take Metformin: DO NOT TAKE your Metformin for two days. This medication can interact with the contrast used during your procedure therefore we want to ensure the contrast has left your body prior to you restarting your Metformin.   PLEASE HOLD AND RESTART YOUR METFORMIN ON 8/9/2021.

## 2021-08-07 NOTE — DISCHARGE NOTE PROVIDER - NSDCFUADDINST_GEN_ALL_CORE_FT
***You underwent a coronary angiogram and should wait 3 days before returning to ordinary activities.   ***The catheter from your wrist/groin was removed and you should remove the dressing in 24 hours. ***You may shower once the dressing is removed, but avoid baths, hot tubs, or swimming for 5 days to prevent infection.   ***If you notice bleeding from the site, hardening and pain at the site, drainage or redness from the site, coolness/paleness of the extremity, swelling, or fever, please call [phone].

## 2021-08-12 DIAGNOSIS — E78.5 HYPERLIPIDEMIA, UNSPECIFIED: ICD-10-CM

## 2021-08-12 DIAGNOSIS — Z79.84 LONG TERM (CURRENT) USE OF ORAL HYPOGLYCEMIC DRUGS: ICD-10-CM

## 2021-08-12 DIAGNOSIS — I25.110 ATHEROSCLEROTIC HEART DISEASE OF NATIVE CORONARY ARTERY WITH UNSTABLE ANGINA PECTORIS: ICD-10-CM

## 2021-08-12 DIAGNOSIS — I25.84 CORONARY ATHEROSCLEROSIS DUE TO CALCIFIED CORONARY LESION: ICD-10-CM

## 2021-08-12 DIAGNOSIS — Z79.82 LONG TERM (CURRENT) USE OF ASPIRIN: ICD-10-CM

## 2021-08-12 DIAGNOSIS — I10 ESSENTIAL (PRIMARY) HYPERTENSION: ICD-10-CM

## 2021-08-12 DIAGNOSIS — Z79.02 LONG TERM (CURRENT) USE OF ANTITHROMBOTICS/ANTIPLATELETS: ICD-10-CM

## 2021-08-12 DIAGNOSIS — Z85.46 PERSONAL HISTORY OF MALIGNANT NEOPLASM OF PROSTATE: ICD-10-CM

## 2021-08-12 DIAGNOSIS — E11.9 TYPE 2 DIABETES MELLITUS WITHOUT COMPLICATIONS: ICD-10-CM

## 2021-08-12 DIAGNOSIS — Z92.3 PERSONAL HISTORY OF IRRADIATION: ICD-10-CM

## 2021-08-17 ENCOUNTER — APPOINTMENT (OUTPATIENT)
Dept: HEART AND VASCULAR | Facility: CLINIC | Age: 81
End: 2021-08-17
Payer: MEDICARE

## 2021-08-17 VITALS
HEART RATE: 68 BPM | OXYGEN SATURATION: 97 % | RESPIRATION RATE: 14 BRPM | WEIGHT: 175 LBS | SYSTOLIC BLOOD PRESSURE: 144 MMHG | DIASTOLIC BLOOD PRESSURE: 72 MMHG | TEMPERATURE: 98.1 F | HEIGHT: 65 IN | BODY MASS INDEX: 29.16 KG/M2

## 2021-08-17 PROCEDURE — 99214 OFFICE O/P EST MOD 30 MIN: CPT

## 2021-08-17 NOTE — REASON FOR VISIT
[FreeTextEntry1] : 81-year-old man with past medical history of CAD s/p PCI ,   diabetes, hyperlipidemia here for follow-up from recent PCI\par The patient denies chest pain, shortness of breath, palpitations, syncope, presyncope, LE edema, orthopnea. The patient can walk up to 15 blocks without any symptoms \par Reports compliance with his medications\par \par \par \par \par \par Pharmacological nuclear stress test 6/25/2021\par There is a medium sized reversible perfusion defect of moderate intensity involving the entire inferior and inferolateral myocardial wall suggestive of inducible ischemia in the posterior coronary circulation\par In addition there is a small reversible perfusion defect of mild intensity involving the mid apical anterior myocardial wall suggestive of inducible myocardial ischemia in the anterior LAD territory\par Normal left ventricular systolic function\par \par Echocardiogram 6/25/2021\par Normal left ventricular size and function\par Grade 1 diastolic dysfunction\par Trace MR\par Trace TR\par \par Current medications\par Clopidogrel 75 mg daily\par Metoprolol 12.5 twice a day\par Lovastatin 10 mg daily\par Metformin\par Losartan 12.5 twice a day\par Aspirin 81\par \par EKG 6/15/2021\par Sinus rhythm, left anterior fascicular block\par \par CARDIAC CATH 8/6/2021\par PROCEDURE: CORONARY ANGIOGRAM, LHC, LVGRAM, ROTATIONAL ATHERECTOMY, PCI\par INDICATION: UNSTABLE ANGINA/POSITIVE STRESS TEST\par ATTENDING: DR. MARTIN RYAN MD \par ACCESS: RRA/6F RFA (s/p perclose), 6F RFV ( sutured) \par \par FINDINGS \par mRCA= 90% severely calcific \par dRCA=80% \par LM= 20% distal\par pLAD= 70% severely calcific\par mLAD= patent stent, 80% distal to stent\par pLCx= moderate \par dLCx= severe diffuse\par \par LVEF:55 %\par LVEDP: 8 mmHg\par \par PROCEDURE: \par Successful rotational atherectomy with 1.5 marie and PCI of mid and distal RCA  with LIZ x 2 (Synergy 2.75 x 24 and Synergy 3.5 x 38) with excellent angiographic result \par \par \par Discharge Medications\par Aspirin Enteric Coated 81 mg oral delayed release tablet: \par 1 tab(s) orally once a day \par atorvastatin 80 mg oral tablet: 1 tab(s) orally once a day (at bedtime) \par losartan 25 mg oral tablet: 0.5 tab(s) orally once a day \par metFORMIN 500 mg oral tablet: 1 tab(s) orally once a day HOLD FOR 2 DAYS \par RESTART 8/9 \par metoprolol succinate 25 mg oral tablet, extended release: 0.5 tab(s) orally \par once a day \par omeprazole 40 mg oral delayed release capsule: 1 cap(s) orally 2 times a day \par Plavix 75 mg oral tablet: 1 tab(s) orally once a day \par tamsulosin 0.4 mg oral capsule: 1 cap(s) orally once a day \par Vitamin B6 50 mg oral tablet: 1 tab(s) orally once a day \par Vitamin C 500 mg oral tablet: 1 tab(s) orally once a day \par

## 2021-08-17 NOTE — ASSESSMENT
[FreeTextEntry1] : 81-year-old man with past medical history of CAD s/p PCI in 2010 at [facility],   diabetes, hyperlipidemia here for follow-up\par \par CAD s/p PCI \par -The patient is asymptomatic with good exercise tolerance\par -Recommend medication compliance\par -Continue aspirin 81 mg daily\par -Continue clopidogrel 75 mg daily\par -Continue metoprolol succinate 25 mg daily (full tablet)\par -Plan for staged PCI of the LAD in 5 weeks\par -Recommended to call the clinic immediately if new onset of chest pain\par \par HTN\par -Not optimally controlled today\par -Continue losartan 25 mg daily (asked the patient to take a full tablet instead of half as prescribed)\par -Continue metoprolol succinate 25 mg daily (full tablet)\par -echo as above\par -CMP wnl 6/13/2021\par \par HLD\par -Continue with atorvastatin 80 mg daily for at least 3 months post PCI\par \par Follow-up in [place] on Thursday, September 9 for symptoms assessment and to schedule PCI of LAD

## 2021-09-16 ENCOUNTER — APPOINTMENT (OUTPATIENT)
Dept: HEART AND VASCULAR | Facility: CLINIC | Age: 81
End: 2021-09-16
Payer: MEDICARE

## 2021-09-16 VITALS
TEMPERATURE: 97.5 F | RESPIRATION RATE: 15 BRPM | DIASTOLIC BLOOD PRESSURE: 74 MMHG | HEIGHT: 65 IN | WEIGHT: 174 LBS | SYSTOLIC BLOOD PRESSURE: 137 MMHG | BODY MASS INDEX: 28.99 KG/M2 | OXYGEN SATURATION: 97 % | HEART RATE: 75 BPM

## 2021-09-16 PROCEDURE — 99215 OFFICE O/P EST HI 40 MIN: CPT

## 2021-09-16 NOTE — ASSESSMENT
[FreeTextEntry1] : 81-year-old man with past medical history of CAD s/p PCI in 2010 at [facility],   diabetes, hyperlipidemia here for follow-up\par \par CAD s/p PCI \par -The patient is asymptomatic with good exercise tolerance\par -Recommend medication compliance\par -Continue aspirin 81 mg daily\par -Continue clopidogrel 75 mg daily\par -Continue metoprolol succinate 25 mg daily (full tablet)\par -Plan for staged PCI of the LAD on October 1, 21 at the [facility]\par -Covid testing as per hospital protocol in [place]\par -Recommended to call the clinic immediately if new onset of chest pain\par \par HTN\par -Well-controlled today\par -Continue losartan 25 mg daily \par -Continue metoprolol succinate 25 mg daily (full tablet)\par -echo as above\par \par HLD\par -Continue with atorvastatin 80 mg daily for at least 3 months post PCI\par \par Follow-up 1 week after PCI

## 2021-09-16 NOTE — REASON FOR VISIT
[FreeTextEntry1] : 81-year-old man with past medical history of CAD s/p PCI ,   diabetes, hyperlipidemia here for follow-up after recent PCI\par The patient denies chest pain, shortness of breath, palpitations, syncope, presyncope, LE edema, orthopnea. The patient can walk up to 15 blocks without any symptoms \par Reports compliance with his medications\par He is scheduled for staged PCI of his LAD. \par \par \par \par Pharmacological nuclear stress test 6/25/2021\par There is a medium sized reversible perfusion defect of moderate intensity involving the entire inferior and inferolateral myocardial wall suggestive of inducible ischemia in the posterior coronary circulation\par In addition there is a small reversible perfusion defect of mild intensity involving the mid apical anterior myocardial wall suggestive of inducible myocardial ischemia in the anterior LAD territory\par Normal left ventricular systolic function\par \par Echocardiogram 6/25/2021\par Normal left ventricular size and function\par Grade 1 diastolic dysfunction\par Trace MR\par Trace TR\par \par Current medications\par Clopidogrel 75 mg daily\par Metoprolol succinate 25 mg daily\par Atorvastatin 80 mg daily\par Metformin\par Losartan 25 mg daily\par Aspirin 81\par \par \par EKG 6/15/2021\par Sinus rhythm, left anterior fascicular block\par \par CARDIAC CATH 8/6/2021\par PROCEDURE: CORONARY ANGIOGRAM, LHC, LVGRAM, ROTATIONAL ATHERECTOMY, PCI\par INDICATION: UNSTABLE ANGINA/POSITIVE STRESS TEST\par ATTENDING: DR. MARTIN RYAN MD \par ACCESS: RRA/6F RFA (s/p perclose), 6F RFV ( sutured) \par \par FINDINGS \par mRCA= 90% severely calcific \par dRCA=80% \par LM= 20% distal\par pLAD= 70% severely calcific\par mLAD= patent stent, 80% distal to stent\par pLCx= moderate \par dLCx= severe diffuse\par \par LVEF:55 %\par LVEDP: 8 mmHg\par \par PROCEDURE: \par Successful rotational atherectomy with 1.5 marie and PCI of mid and distal RCA  with LIZ x 2 (Synergy 2.75 x 24 and Synergy 3.5 x 38) with excellent angiographic result \par \par \par Discharge Medications\par Aspirin Enteric Coated 81 mg oral delayed release tablet: \par 1 tab(s) orally once a day \par atorvastatin 80 mg oral tablet: 1 tab(s) orally once a day (at bedtime) \par losartan 25 mg oral tablet: 0.5 tab(s) orally once a day \par metFORMIN 500 mg oral tablet: 1 tab(s) orally once a day HOLD FOR 2 DAYS \par RESTART 8/9 \par metoprolol succinate 25 mg oral tablet, extended release: 0.5 tab(s) orally \par once a day \par omeprazole 40 mg oral delayed release capsule: 1 cap(s) orally 2 times a day \par Plavix 75 mg oral tablet: 1 tab(s) orally once a day \par tamsulosin 0.4 mg oral capsule: 1 cap(s) orally once a day \par Vitamin B6 50 mg oral tablet: 1 tab(s) orally once a day \par Vitamin C 500 mg oral tablet: 1 tab(s) orally once a day \par

## 2021-09-29 ENCOUNTER — APPOINTMENT (OUTPATIENT)
Dept: HEART AND VASCULAR | Facility: CLINIC | Age: 81
End: 2021-09-29
Payer: MEDICARE

## 2021-09-29 VITALS
HEIGHT: 66 IN | OXYGEN SATURATION: 97 % | DIASTOLIC BLOOD PRESSURE: 67 MMHG | SYSTOLIC BLOOD PRESSURE: 146 MMHG | TEMPERATURE: 97 F | WEIGHT: 181 LBS | RESPIRATION RATE: 16 BRPM | HEART RATE: 60 BPM

## 2021-09-29 PROCEDURE — ZZZZZ: CPT

## 2021-09-29 RX ORDER — CHLORHEXIDINE GLUCONATE 213 G/1000ML
1 SOLUTION TOPICAL ONCE
Refills: 0 | Status: DISCONTINUED | OUTPATIENT
Start: 2021-10-01 | End: 2021-10-02

## 2021-09-29 RX ORDER — METOPROLOL TARTRATE 50 MG
0.5 TABLET ORAL
Qty: 0 | Refills: 0 | DISCHARGE

## 2021-09-29 RX ORDER — LOSARTAN POTASSIUM 100 MG/1
0.5 TABLET, FILM COATED ORAL
Qty: 0 | Refills: 0 | DISCHARGE

## 2021-09-29 NOTE — H&P ADULT - HISTORY OF PRESENT ILLNESS
CARDIOLOGIST: Dr. Forbes   COVID: vaccinated 5/2021  PHARMACY: [facility] (in [place]) and [facility]  ESCORT: Wife    80yo M w/ PMHx of HTN, HLD, DM Type II, Prostate CA (s/p radiation in 1841-1997; in remission and no active treatment in progress), and 3V CAD s/p prior PCI mLAD 2010 most recent PCI LIZ dRCA and Atherectomy and LIZ mRCA 8/6/2021 at [facility] who presents for staged PCI of residual LAD disease. Patient originally presented for cardiac cath due to patient’s history of CAD and abnormal NST. Patient underwent cardiac cath with successful LIZ RCA x 2 (mid and distal). Procedure complicated by vagal episode with hypotension BP 73/47 and Bradycardia HR 50s. Patient received total of Neosynephrine 600 mcg and NS 500cc Bolus with temporary insertion of TVP for monitoring which was removed. Patient monitored overnight and subsequently discharged 8/7/2021. Since procedure patient reports feeling well and denies C/P, SOB, palpitations, dizziness, diaphoresis, N/V, syncope, fevers, chills, cough, abdominal pain, diarrhea, recent travel, known contact with sick individuals or Covid 19+ individuals, URI symptoms.   CARDIOLOGIST: Dr. Forbes   COVID: vaccinated 5/2021  PHARMACY: [facility] (in [place]) and [facility]  ESCORT: Wife    80yo M w/ PMHx of HTN, HLD, DM Type II, Prostate CA (s/p radiation in 1032-4113; in remission and no active treatment in progress), and 3V CAD s/p prior PCI mLAD 2010 most recent PCI LIZ dRCA and Atherectomy and LIZ mRCA 8/6/2021 at [facility] who presents for staged PCI of residual LAD disease. Patient originally presented for cardiac cath due to patient’s history of CAD and abnormal NST. Patient underwent cardiac cath with successful LIZ RCA x 2 (mid and distal). Procedure complicated by vagal episode with hypotension BP 73/47 and Bradycardia HR 50s. Patient received total of Neosynephrine 600 mcg and NS 500cc Bolus with temporary insertion of TVP for monitoring which was removed. Patient monitored overnight and subsequently discharged 8/7/2021. Since procedure patient reports feeling well and denies C/P, SOB, palpitations, dizziness, diaphoresis, N/V, syncope, fevers, chills, cough, abdominal pain, diarrhea, recent travel, known contact with sick individuals or Covid 19+ individuals, URI symptoms.      Cardiac Cath at [facility] 8/6/20201 : LM: mild atherosclerosis. LAD: proximal subtotal 70%, patent mLAD stent, 80% distal to the stent. LCx: moderate atherosclerosis in proximal portion, distal shows severe diffuse disease. Atherectomy and LIZ mRCA 90%, LIZ dRCA 80%. LVEF 55%. LVEDP 8 mm Hg.       TTE (6/25/2021): normal LV systolic fxn, Grade I diastolic dysfunction, trace MR, trace TR.  CARDIOLOGIST: Dr. Forbes   COVID: vaccinated 5/2021-Covid test at Dr. Forbes's office [place] 9/29/2021  PHARMACY: [facility] (in [place]) and [facility]  ESCORT: Wife     PATIENT WILL BRING MEDS PLEASE UPDATE    80yo M w/ PMHx of HTN, HLD, DM Type II, Prostate CA (s/p radiation in 4299-0984; in remission and no active treatment in progress), and 3V CAD s/p prior PCI mLAD 2010 most recent PCI LIZ dRCA and Atherectomy and LIZ mRCA 8/6/2021 at [facility] who presents for staged PCI of residual LAD disease. Patient originally presented for cardiac cath due to patient’s history of CAD and abnormal NST. Patient underwent cardiac cath with successful LIZ RCA x 2 (mid and distal). Procedure complicated by vagal episode with hypotension BP 73/47 and Bradycardia HR 50s. Patient received total of Neosynephrine 600 mcg and NS 500cc Bolus with temporary insertion of TVP for monitoring which was removed. Patient monitored overnight and subsequently discharged 8/7/2021. Since procedure patient reports feeling well and denies C/P, SOB, palpitations, dizziness, diaphoresis, N/V, syncope, fevers, chills, cough, abdominal pain, diarrhea, recent travel, known contact with sick individuals or Covid 19+ individuals, URI symptoms.  Patient reports compliance with Aspirin and Plavix and denies bleeding, BRBPR, melena, hematuria. Patient now presents for staged PCI of LAD disease.    Cardiac Cath at [facility] 8/6/20201 : LM: mild atherosclerosis. LAD: proximal subtotal 70%, patent mLAD stent, 80% distal to the stent. LCx: moderate atherosclerosis in proximal portion, distal shows severe diffuse disease. Atherectomy and LIZ mRCA 90%, LIZ dRCA 80%. LVEF 55%. LVEDP 8 mm Hg.       TTE (6/25/2021): normal LV systolic fxn, Grade I diastolic dysfunction, trace MR, trace TR.  CARDIOLOGIST: Dr. Forbes   COVID: vaccinated 5/2021-Covid test at Dr. Forbes's office [place] 9/29/2021: Negative in [facility]  PHARMACY: [facility] (in [place]) and [facility]  ESCORT: Wife    82yo M w/ PMHx of HTN, HLD, DM Type II, Prostate CA (s/p radiation in 0226-8405; in remission and no active treatment in progress), and 3V CAD s/p prior PCI mLAD 2010 most recent PCI LIZ dRCA and Atherectomy and LIZ mRCA 8/6/2021 at [facility] who presents for staged PCI of residual LAD disease. Patient originally presented for cardiac cath due to patient’s history of CAD and abnormal NST. Patient underwent cardiac cath with successful LIZ RCA x 2 (mid and distal). Procedure complicated by vagal episode with hypotension BP 73/47 and Bradycardia HR 50s. Patient received total of Neosynephrine 600 mcg and NS 500cc Bolus with temporary insertion of TVP for monitoring which was removed. Patient monitored overnight and subsequently discharged 8/7/2021. Since procedure patient reports feeling well and denies C/P, SOB, palpitations, dizziness, diaphoresis, N/V, syncope, fevers, chills, cough, abdominal pain, diarrhea, recent travel, known contact with sick individuals or Covid 19+ individuals, URI symptoms.  Patient reports compliance with Aspirin and Plavix and denies bleeding, BRBPR, melena, hematuria. Patient now presents for staged PCI of LAD disease.    Cardiac Cath at [facility] 8/6/20201 : LM: mild atherosclerosis. LAD: proximal subtotal 70%, patent mLAD stent, 80% distal to the stent. LCx: moderate atherosclerosis in proximal portion, distal shows severe diffuse disease. Atherectomy and LIZ mRCA 90%, LIZ dRCA 80%. LVEF 55%. LVEDP 8 mm Hg.       TTE (6/25/2021): normal LV systolic fxn, Grade I diastolic dysfunction, trace MR, trace TR.  CARDIOLOGIST: Dr. Forbes   COVID: vaccinated 5/2021-Covid test at Dr. Forbes's office [place] 9/29/2021: Negative in [facility]  PHARMACY: [facility] (in [place]) and [facility]  ESCORT: Wife    80yo M w/ PMHx of HTN, HLD, DM Type II, Prostate CA (s/p radiation in 9922-6593; in remission and no active treatment in progress), and 3V CAD s/p prior PCI mLAD 2010 most recent PCI LIZ dRCA and Atherectomy and LIZ mRCA 8/6/2021 at [facility] who presents for staged PCI of residual LAD disease. Patient originally presented for cardiac cath due to patient’s history of CAD and abnormal NST. Patient underwent cardiac cath with successful LIZ RCA x 2 (mid and distal). Procedure complicated by vagal episode with hypotension BP 73/47 and Bradycardia HR 50s. Patient received total of Neosynephrine 600 mcg and NS 500cc Bolus with temporary insertion of TVP for monitoring which was removed. Patient monitored overnight and subsequently discharged 8/7/2021. Since procedure patient reports feeling well and denies C/P, SOB, palpitations, dizziness, diaphoresis, N/V, syncope, fevers, chills, cough, abdominal pain, diarrhea, recent travel, known contact with sick individuals or Covid 19+ individuals, URI symptoms.  Patient reports compliance with Aspirin and Plavix and denies bleeding, BRBPR, melena, hematuria.   Patient now presents for staged PCI of LAD disease.    Cardiac Cath at [facility] 8/6/20201 : LM: mild atherosclerosis. LAD: proximal subtotal 70%, patent mLAD stent, 80% distal to the stent. LCx: moderate atherosclerosis in proximal portion, distal shows severe diffuse disease. Atherectomy and LIZ mRCA 90%, LIZ dRCA 80%. LVEF 55%. LVEDP 8 mm Hg.       TTE (6/25/2021): normal LV systolic fxn, Grade I diastolic dysfunction, trace MR, trace TR.

## 2021-09-29 NOTE — H&P ADULT - NSICDXPASTSURGICALHX_GEN_ALL_CORE_FT
PAST SURGICAL HISTORY:  History of percutaneous angioplasty      PAST SURGICAL HISTORY:  H/O colonoscopy 2015    H/O inguinal hernia repair on the left - 3/2012    History of esophagogastroduodenoscopy (EGD) 2015    History of percutaneous angioplasty 2010 & 8/6/21

## 2021-09-29 NOTE — H&P ADULT - ASSESSMENT
80yo M w/ PMHx of HTN, HLD, DM Type II, Prostate CA (s/p radiation in 1001-8886; in remission and no active treatment in progress), and 3V CAD s/p prior PCI mLAD 2010 most recent PCI LIZ dRCA and Atherectomy and LIZ mRCA 8/6/2021 at [facility], who presents for staged PCI of residual LAD disease.  				  ASA _____				Mallampati class: _________	              A/P:        Sedation Plan:   Moderate     Patient Is Suitable Candidate For Sedation: Yes        Risks & benefits of procedure and sedation and risks and benefits for the alternative therapy have been explained to the patient in layman’s terms including but not limited to: allergic reaction, bleeding, infection, arrhythmia, respiratory compromise, renal and vascular compromise, limb damage, MI, CVA, emergent CABG/Vascular Surgery and death.     -Informed consent obtained and in chart.  -Meds conformed with the  -IVF: NS @ 75/hr   -Pt is on daily ASA/Plavix 82yo M w/ PMHx of HTN, HLD, DM Type II, Prostate CA (s/p radiation in 4185-8199; in remission and no active treatment in progress), and 3V CAD s/p prior PCI mLAD 2010 most recent PCI LIZ dRCA and Atherectomy and LIZ mRCA 8/6/2021 at [facility], who presents for staged PCI of residual LAD disease.  				  ASA _III____				Mallampati class: __III_______	                A/P:      Sedation Plan:   Moderate     Patient Is Suitable Candidate For Sedation: Yes        Risks & benefits of procedure and sedation and risks and benefits for the alternative therapy have been explained to the patient in layman’s terms including but not limited to: allergic reaction, bleeding, infection, arrhythmia, respiratory compromise, renal and vascular compromise, limb damage, MI, CVA, emergent CABG/Vascular Surgery and death.     -Informed consent obtained and in chart.  -Meds conformed with the patient  -IVF: NS @ 75/hr   -Pt is on daily ASA/Plavix, took it this morning

## 2021-09-29 NOTE — H&P ADULT - NSHPLABSRESULTS_GEN_ALL_CORE
ECG: ECG: NSR @ 61 bpm, no acute ST-T changes, QTc 394      Labs:                          14.0   5.00  )-----------( 245      ( 01 Oct 2021 12:32 )             41.9       Auto Neutrophil %: 74.0 % (10-01-21 @ 12:32)  Auto Immature Granulocyte %: 0.4 % (10-01-21 @ 12:32)         Coags:     12.2   ----< 1.02    ( 01 Oct 2021 12:32 )     34.1 ECG: NSR @ 61 bpm, no acute ST-T changes, QTc 394      Labs:                        14.0   5.00  )-----------( 245      ( 01 Oct 2021 12:32 )             41.9       Auto Neutrophil %: 74.0 % (10-01-21 @ 12:32)  Auto Immature Granulocyte %: 0.4 % (10-01-21 @ 12:32)    10-01    136  |  98  |  9   ----------------------------<  107<H>  4.2   |  28  |  0.72      Calcium, Total Serum: 9.6 mg/dL (10-01-21 @ 12:32)      LFTs:             7.3  | 0.4  | 22       ------------------[118     ( 01 Oct 2021 12:32 )  4.8  | x    | 20            Coags:     12.2   ----< 1.02    ( 01 Oct 2021 12:32 )     34.1        CARDIAC MARKERS ( 01 Oct 2021 12:32 )  x     / x     / 98 U/L / x     / 2.0 ng/mL

## 2021-09-29 NOTE — H&P ADULT - NSICDXPASTMEDICALHX_GEN_ALL_CORE_FT
PAST MEDICAL HISTORY:  CAD (coronary artery disease)     DM type 2 (diabetes mellitus, type 2)     GERD (gastroesophageal reflux disease)     HLD (hyperlipidemia)     HTN (hypertension)     Prostate CA s/p radiation [id number]

## 2021-09-30 LAB — SARS-COV-2 N GENE NPH QL NAA+PROBE: NOT DETECTED

## 2021-10-01 ENCOUNTER — INPATIENT (INPATIENT)
Facility: HOSPITAL | Age: 81
LOS: 0 days | Discharge: HOME | DRG: 247 | End: 2021-10-02
Attending: INTERNAL MEDICINE | Admitting: INTERNAL MEDICINE
Payer: COMMERCIAL

## 2021-10-01 DIAGNOSIS — Z98.890 OTHER SPECIFIED POSTPROCEDURAL STATES: Chronic | ICD-10-CM

## 2021-10-01 LAB
A1C WITH ESTIMATED AVERAGE GLUCOSE RESULT: 6.3 % — HIGH (ref 4–5.6)
ALBUMIN SERPL ELPH-MCNC: 4.8 G/DL — SIGNIFICANT CHANGE UP (ref 3.3–5)
ALP SERPL-CCNC: 118 U/L — SIGNIFICANT CHANGE UP (ref 40–120)
ALT FLD-CCNC: 20 U/L — SIGNIFICANT CHANGE UP (ref 10–45)
ANION GAP SERPL CALC-SCNC: 10 MMOL/L — SIGNIFICANT CHANGE UP (ref 5–17)
APTT BLD: 34.1 SEC — SIGNIFICANT CHANGE UP (ref 27.5–35.5)
AST SERPL-CCNC: 22 U/L — SIGNIFICANT CHANGE UP (ref 10–40)
BASOPHILS # BLD AUTO: 0.04 K/UL — SIGNIFICANT CHANGE UP (ref 0–0.2)
BASOPHILS NFR BLD AUTO: 0.8 % — SIGNIFICANT CHANGE UP (ref 0–2)
BILIRUB SERPL-MCNC: 0.4 MG/DL — SIGNIFICANT CHANGE UP (ref 0.2–1.2)
BUN SERPL-MCNC: 9 MG/DL — SIGNIFICANT CHANGE UP (ref 7–23)
CALCIUM SERPL-MCNC: 9.6 MG/DL — SIGNIFICANT CHANGE UP (ref 8.4–10.5)
CHLORIDE SERPL-SCNC: 98 MMOL/L — SIGNIFICANT CHANGE UP (ref 96–108)
CHOLEST SERPL-MCNC: 124 MG/DL — SIGNIFICANT CHANGE UP
CK MB CFR SERPL CALC: 2 NG/ML — SIGNIFICANT CHANGE UP (ref 0–6.7)
CK SERPL-CCNC: 98 U/L — SIGNIFICANT CHANGE UP (ref 30–200)
CO2 SERPL-SCNC: 28 MMOL/L — SIGNIFICANT CHANGE UP (ref 22–31)
CREAT SERPL-MCNC: 0.72 MG/DL — SIGNIFICANT CHANGE UP (ref 0.5–1.3)
EOSINOPHIL # BLD AUTO: 0.08 K/UL — SIGNIFICANT CHANGE UP (ref 0–0.5)
EOSINOPHIL NFR BLD AUTO: 1.6 % — SIGNIFICANT CHANGE UP (ref 0–6)
ESTIMATED AVERAGE GLUCOSE: 134 MG/DL — HIGH (ref 68–114)
GLUCOSE SERPL-MCNC: 107 MG/DL — HIGH (ref 70–99)
HCT VFR BLD CALC: 41.9 % — SIGNIFICANT CHANGE UP (ref 39–50)
HDLC SERPL-MCNC: 48 MG/DL — SIGNIFICANT CHANGE UP
HGB BLD-MCNC: 14 G/DL — SIGNIFICANT CHANGE UP (ref 13–17)
IMM GRANULOCYTES NFR BLD AUTO: 0.4 % — SIGNIFICANT CHANGE UP (ref 0–1.5)
INR BLD: 1.02 — SIGNIFICANT CHANGE UP (ref 0.88–1.16)
LIPID PNL WITH DIRECT LDL SERPL: 66 MG/DL — SIGNIFICANT CHANGE UP
LYMPHOCYTES # BLD AUTO: 0.66 K/UL — LOW (ref 1–3.3)
LYMPHOCYTES # BLD AUTO: 13.2 % — SIGNIFICANT CHANGE UP (ref 13–44)
MCHC RBC-ENTMCNC: 29.3 PG — SIGNIFICANT CHANGE UP (ref 27–34)
MCHC RBC-ENTMCNC: 33.4 GM/DL — SIGNIFICANT CHANGE UP (ref 32–36)
MCV RBC AUTO: 87.7 FL — SIGNIFICANT CHANGE UP (ref 80–100)
MONOCYTES # BLD AUTO: 0.5 K/UL — SIGNIFICANT CHANGE UP (ref 0–0.9)
MONOCYTES NFR BLD AUTO: 10 % — SIGNIFICANT CHANGE UP (ref 2–14)
NEUTROPHILS # BLD AUTO: 3.7 K/UL — SIGNIFICANT CHANGE UP (ref 1.8–7.4)
NEUTROPHILS NFR BLD AUTO: 74 % — SIGNIFICANT CHANGE UP (ref 43–77)
NON HDL CHOLESTEROL: 76 MG/DL — SIGNIFICANT CHANGE UP
NRBC # BLD: 0 /100 WBCS — SIGNIFICANT CHANGE UP (ref 0–0)
PLATELET # BLD AUTO: 245 K/UL — SIGNIFICANT CHANGE UP (ref 150–400)
POTASSIUM SERPL-MCNC: 4.2 MMOL/L — SIGNIFICANT CHANGE UP (ref 3.5–5.3)
POTASSIUM SERPL-SCNC: 4.2 MMOL/L — SIGNIFICANT CHANGE UP (ref 3.5–5.3)
PROT SERPL-MCNC: 7.3 G/DL — SIGNIFICANT CHANGE UP (ref 6–8.3)
PROTHROM AB SERPL-ACNC: 12.2 SEC — SIGNIFICANT CHANGE UP (ref 10.6–13.6)
RBC # BLD: 4.78 M/UL — SIGNIFICANT CHANGE UP (ref 4.2–5.8)
RBC # FLD: 12.9 % — SIGNIFICANT CHANGE UP (ref 10.3–14.5)
SODIUM SERPL-SCNC: 136 MMOL/L — SIGNIFICANT CHANGE UP (ref 135–145)
TRIGL SERPL-MCNC: 48 MG/DL — SIGNIFICANT CHANGE UP
WBC # BLD: 5 K/UL — SIGNIFICANT CHANGE UP (ref 3.8–10.5)
WBC # FLD AUTO: 5 K/UL — SIGNIFICANT CHANGE UP (ref 3.8–10.5)

## 2021-10-01 PROCEDURE — 99152 MOD SED SAME PHYS/QHP 5/>YRS: CPT

## 2021-10-01 PROCEDURE — 92933 PRQ TRLML C ATHRC ST ANGIOP1: CPT | Mod: LD

## 2021-10-01 RX ORDER — ATORVASTATIN CALCIUM 80 MG/1
80 TABLET, FILM COATED ORAL AT BEDTIME
Refills: 0 | Status: DISCONTINUED | OUTPATIENT
Start: 2021-10-01 | End: 2021-10-02

## 2021-10-01 RX ORDER — METFORMIN HYDROCHLORIDE 850 MG/1
1 TABLET ORAL
Qty: 0 | Refills: 0 | DISCHARGE

## 2021-10-01 RX ORDER — ASCORBIC ACID 60 MG
500 TABLET,CHEWABLE ORAL DAILY
Refills: 0 | Status: DISCONTINUED | OUTPATIENT
Start: 2021-10-01 | End: 2021-10-02

## 2021-10-01 RX ORDER — ASPIRIN/CALCIUM CARB/MAGNESIUM 324 MG
81 TABLET ORAL DAILY
Refills: 0 | Status: DISCONTINUED | OUTPATIENT
Start: 2021-10-02 | End: 2021-10-02

## 2021-10-01 RX ORDER — METOPROLOL TARTRATE 50 MG
1 TABLET ORAL
Qty: 0 | Refills: 0 | DISCHARGE

## 2021-10-01 RX ORDER — TAMSULOSIN HYDROCHLORIDE 0.4 MG/1
1 CAPSULE ORAL
Qty: 0 | Refills: 0 | DISCHARGE

## 2021-10-01 RX ORDER — FERROUS SULFATE 325(65) MG
65 TABLET ORAL
Qty: 0 | Refills: 0 | DISCHARGE

## 2021-10-01 RX ORDER — DEXTROSE 50 % IN WATER 50 %
12.5 SYRINGE (ML) INTRAVENOUS ONCE
Refills: 0 | Status: DISCONTINUED | OUTPATIENT
Start: 2021-10-01 | End: 2021-10-02

## 2021-10-01 RX ORDER — DEXTROSE 50 % IN WATER 50 %
25 SYRINGE (ML) INTRAVENOUS ONCE
Refills: 0 | Status: DISCONTINUED | OUTPATIENT
Start: 2021-10-01 | End: 2021-10-02

## 2021-10-01 RX ORDER — GLUCAGON INJECTION, SOLUTION 0.5 MG/.1ML
1 INJECTION, SOLUTION SUBCUTANEOUS ONCE
Refills: 0 | Status: DISCONTINUED | OUTPATIENT
Start: 2021-10-01 | End: 2021-10-02

## 2021-10-01 RX ORDER — DEXTROSE 50 % IN WATER 50 %
15 SYRINGE (ML) INTRAVENOUS ONCE
Refills: 0 | Status: DISCONTINUED | OUTPATIENT
Start: 2021-10-01 | End: 2021-10-02

## 2021-10-01 RX ORDER — LOSARTAN POTASSIUM 100 MG/1
25 TABLET, FILM COATED ORAL DAILY
Refills: 0 | Status: DISCONTINUED | OUTPATIENT
Start: 2021-10-02 | End: 2021-10-02

## 2021-10-01 RX ORDER — FERROUS SULFATE 325(65) MG
325 TABLET ORAL DAILY
Refills: 0 | Status: DISCONTINUED | OUTPATIENT
Start: 2021-10-01 | End: 2021-10-02

## 2021-10-01 RX ORDER — PANTOPRAZOLE SODIUM 20 MG/1
40 TABLET, DELAYED RELEASE ORAL
Refills: 0 | Status: DISCONTINUED | OUTPATIENT
Start: 2021-10-01 | End: 2021-10-02

## 2021-10-01 RX ORDER — ASCORBIC ACID 60 MG
1 TABLET,CHEWABLE ORAL
Qty: 0 | Refills: 0 | DISCHARGE

## 2021-10-01 RX ORDER — CLOPIDOGREL BISULFATE 75 MG/1
75 TABLET, FILM COATED ORAL DAILY
Refills: 0 | Status: DISCONTINUED | OUTPATIENT
Start: 2021-10-02 | End: 2021-10-02

## 2021-10-01 RX ORDER — LOSARTAN POTASSIUM 100 MG/1
1 TABLET, FILM COATED ORAL
Qty: 0 | Refills: 0 | DISCHARGE

## 2021-10-01 RX ORDER — TAMSULOSIN HYDROCHLORIDE 0.4 MG/1
0.4 CAPSULE ORAL AT BEDTIME
Refills: 0 | Status: DISCONTINUED | OUTPATIENT
Start: 2021-10-01 | End: 2021-10-02

## 2021-10-01 RX ORDER — SODIUM CHLORIDE 9 MG/ML
500 INJECTION INTRAMUSCULAR; INTRAVENOUS; SUBCUTANEOUS
Refills: 0 | Status: DISCONTINUED | OUTPATIENT
Start: 2021-10-01 | End: 2021-10-01

## 2021-10-01 RX ORDER — CHOLECALCIFEROL (VITAMIN D3) 125 MCG
1 CAPSULE ORAL
Qty: 0 | Refills: 0 | DISCHARGE

## 2021-10-01 RX ORDER — SODIUM CHLORIDE 9 MG/ML
1000 INJECTION, SOLUTION INTRAVENOUS
Refills: 0 | Status: DISCONTINUED | OUTPATIENT
Start: 2021-10-01 | End: 2021-10-02

## 2021-10-01 RX ORDER — SODIUM CHLORIDE 9 MG/ML
500 INJECTION INTRAMUSCULAR; INTRAVENOUS; SUBCUTANEOUS
Refills: 0 | Status: DISCONTINUED | OUTPATIENT
Start: 2021-10-01 | End: 2021-10-02

## 2021-10-01 RX ORDER — PYRIDOXINE HCL (VITAMIN B6) 100 MG
50 TABLET ORAL DAILY
Refills: 0 | Status: DISCONTINUED | OUTPATIENT
Start: 2021-10-01 | End: 2021-10-02

## 2021-10-01 RX ORDER — INSULIN LISPRO 100/ML
VIAL (ML) SUBCUTANEOUS
Refills: 0 | Status: DISCONTINUED | OUTPATIENT
Start: 2021-10-01 | End: 2021-10-02

## 2021-10-01 RX ORDER — METOPROLOL TARTRATE 50 MG
25 TABLET ORAL DAILY
Refills: 0 | Status: DISCONTINUED | OUTPATIENT
Start: 2021-10-01 | End: 2021-10-02

## 2021-10-01 RX ORDER — PYRIDOXINE HCL (VITAMIN B6) 100 MG
1 TABLET ORAL
Qty: 0 | Refills: 0 | DISCHARGE

## 2021-10-01 RX ADMIN — TAMSULOSIN HYDROCHLORIDE 0.4 MILLIGRAM(S): 0.4 CAPSULE ORAL at 22:19

## 2021-10-01 RX ADMIN — SODIUM CHLORIDE 75 MILLILITER(S): 9 INJECTION INTRAMUSCULAR; INTRAVENOUS; SUBCUTANEOUS at 16:37

## 2021-10-01 RX ADMIN — ATORVASTATIN CALCIUM 80 MILLIGRAM(S): 80 TABLET, FILM COATED ORAL at 22:19

## 2021-10-01 RX ADMIN — SODIUM CHLORIDE 75 MILLILITER(S): 9 INJECTION INTRAMUSCULAR; INTRAVENOUS; SUBCUTANEOUS at 13:04

## 2021-10-02 ENCOUNTER — TRANSCRIPTION ENCOUNTER (OUTPATIENT)
Age: 81
End: 2021-10-02

## 2021-10-02 VITALS
RESPIRATION RATE: 16 BRPM | HEART RATE: 73 BPM | DIASTOLIC BLOOD PRESSURE: 67 MMHG | SYSTOLIC BLOOD PRESSURE: 132 MMHG | OXYGEN SATURATION: 96 %

## 2021-10-02 LAB
ANION GAP SERPL CALC-SCNC: 10 MMOL/L — SIGNIFICANT CHANGE UP (ref 5–17)
BUN SERPL-MCNC: 10 MG/DL — SIGNIFICANT CHANGE UP (ref 7–23)
CALCIUM SERPL-MCNC: 9 MG/DL — SIGNIFICANT CHANGE UP (ref 8.4–10.5)
CHLORIDE SERPL-SCNC: 100 MMOL/L — SIGNIFICANT CHANGE UP (ref 96–108)
CO2 SERPL-SCNC: 26 MMOL/L — SIGNIFICANT CHANGE UP (ref 22–31)
COVID-19 SPIKE DOMAIN AB INTERP: POSITIVE
COVID-19 SPIKE DOMAIN ANTIBODY RESULT: 83.9 U/ML — HIGH
CREAT SERPL-MCNC: 0.74 MG/DL — SIGNIFICANT CHANGE UP (ref 0.5–1.3)
GLUCOSE SERPL-MCNC: 111 MG/DL — HIGH (ref 70–99)
HCT VFR BLD CALC: 38.3 % — LOW (ref 39–50)
HGB BLD-MCNC: 13.3 G/DL — SIGNIFICANT CHANGE UP (ref 13–17)
MAGNESIUM SERPL-MCNC: 2 MG/DL — SIGNIFICANT CHANGE UP (ref 1.6–2.6)
MCHC RBC-ENTMCNC: 30.4 PG — SIGNIFICANT CHANGE UP (ref 27–34)
MCHC RBC-ENTMCNC: 34.7 GM/DL — SIGNIFICANT CHANGE UP (ref 32–36)
MCV RBC AUTO: 87.4 FL — SIGNIFICANT CHANGE UP (ref 80–100)
NRBC # BLD: 0 /100 WBCS — SIGNIFICANT CHANGE UP (ref 0–0)
PLATELET # BLD AUTO: 215 K/UL — SIGNIFICANT CHANGE UP (ref 150–400)
POTASSIUM SERPL-MCNC: 3.9 MMOL/L — SIGNIFICANT CHANGE UP (ref 3.5–5.3)
POTASSIUM SERPL-SCNC: 3.9 MMOL/L — SIGNIFICANT CHANGE UP (ref 3.5–5.3)
RBC # BLD: 4.38 M/UL — SIGNIFICANT CHANGE UP (ref 4.2–5.8)
RBC # FLD: 12.6 % — SIGNIFICANT CHANGE UP (ref 10.3–14.5)
SARS-COV-2 IGG+IGM SERPL QL IA: 83.9 U/ML — HIGH
SARS-COV-2 IGG+IGM SERPL QL IA: POSITIVE
SODIUM SERPL-SCNC: 136 MMOL/L — SIGNIFICANT CHANGE UP (ref 135–145)
WBC # BLD: 8.29 K/UL — SIGNIFICANT CHANGE UP (ref 3.8–10.5)
WBC # FLD AUTO: 8.29 K/UL — SIGNIFICANT CHANGE UP (ref 3.8–10.5)

## 2021-10-02 PROCEDURE — 83036 HEMOGLOBIN GLYCOSYLATED A1C: CPT

## 2021-10-02 PROCEDURE — 92928 PRQ TCAT PLMT NTRAC ST 1 LES: CPT

## 2021-10-02 PROCEDURE — C1874: CPT

## 2021-10-02 PROCEDURE — 82550 ASSAY OF CK (CPK): CPT

## 2021-10-02 PROCEDURE — 82553 CREATINE MB FRACTION: CPT

## 2021-10-02 PROCEDURE — 99239 HOSP IP/OBS DSCHRG MGMT >30: CPT

## 2021-10-02 PROCEDURE — C1724: CPT

## 2021-10-02 PROCEDURE — 80048 BASIC METABOLIC PNL TOTAL CA: CPT

## 2021-10-02 PROCEDURE — 85027 COMPLETE CBC AUTOMATED: CPT

## 2021-10-02 PROCEDURE — 86769 SARS-COV-2 COVID-19 ANTIBODY: CPT

## 2021-10-02 PROCEDURE — 85025 COMPLETE CBC W/AUTO DIFF WBC: CPT

## 2021-10-02 PROCEDURE — C1769: CPT

## 2021-10-02 PROCEDURE — 83735 ASSAY OF MAGNESIUM: CPT

## 2021-10-02 PROCEDURE — C1887: CPT

## 2021-10-02 PROCEDURE — 85730 THROMBOPLASTIN TIME PARTIAL: CPT

## 2021-10-02 PROCEDURE — C1725: CPT

## 2021-10-02 PROCEDURE — 80061 LIPID PANEL: CPT

## 2021-10-02 PROCEDURE — 93458 L HRT ARTERY/VENTRICLE ANGIO: CPT

## 2021-10-02 PROCEDURE — 82962 GLUCOSE BLOOD TEST: CPT

## 2021-10-02 PROCEDURE — C1894: CPT

## 2021-10-02 PROCEDURE — 36415 COLL VENOUS BLD VENIPUNCTURE: CPT

## 2021-10-02 PROCEDURE — 85610 PROTHROMBIN TIME: CPT

## 2021-10-02 PROCEDURE — C1760: CPT

## 2021-10-02 PROCEDURE — 80053 COMPREHEN METABOLIC PANEL: CPT

## 2021-10-02 RX ORDER — ASPIRIN/CALCIUM CARB/MAGNESIUM 324 MG
1 TABLET ORAL
Qty: 30 | Refills: 11
Start: 2021-10-02 | End: 2022-09-26

## 2021-10-02 RX ORDER — PANTOPRAZOLE SODIUM 20 MG/1
1 TABLET, DELAYED RELEASE ORAL
Qty: 30 | Refills: 3
Start: 2021-10-02 | End: 2022-01-29

## 2021-10-02 RX ORDER — CLOPIDOGREL BISULFATE 75 MG/1
1 TABLET, FILM COATED ORAL
Qty: 30 | Refills: 11
Start: 2021-10-02 | End: 2022-09-26

## 2021-10-02 RX ORDER — OMEPRAZOLE 10 MG/1
1 CAPSULE, DELAYED RELEASE ORAL
Qty: 0 | Refills: 0 | DISCHARGE

## 2021-10-02 RX ADMIN — Medication 25 MILLIGRAM(S): at 06:18

## 2021-10-02 RX ADMIN — Medication 81 MILLIGRAM(S): at 11:45

## 2021-10-02 RX ADMIN — Medication 325 MILLIGRAM(S): at 11:44

## 2021-10-02 RX ADMIN — LOSARTAN POTASSIUM 25 MILLIGRAM(S): 100 TABLET, FILM COATED ORAL at 06:18

## 2021-10-02 RX ADMIN — Medication 50 MILLIGRAM(S): at 11:44

## 2021-10-02 RX ADMIN — CLOPIDOGREL BISULFATE 75 MILLIGRAM(S): 75 TABLET, FILM COATED ORAL at 11:44

## 2021-10-02 RX ADMIN — PANTOPRAZOLE SODIUM 40 MILLIGRAM(S): 20 TABLET, DELAYED RELEASE ORAL at 06:18

## 2021-10-02 RX ADMIN — Medication 500 MILLIGRAM(S): at 11:44

## 2021-10-02 NOTE — DISCHARGE NOTE PROVIDER - NSDCMRMEDTOKEN_GEN_ALL_CORE_FT
Aspirin Enteric Coated 81 mg oral delayed release tablet: 1 tab(s) orally once a day  atorvastatin 80 mg oral tablet: 1 tab(s) orally once a day (at bedtime)  cardiac rehab : Cardiac Rehab 3x per week for 12 weeks for diagnosis of unstable angina s/p PCI   iron sulfate: 65 milligram(s) orally once a day  losartan 25 mg oral tablet: 1 tab(s) orally once a day  metFORMIN 500 mg oral tablet: 1 tab(s) orally 2 times a day  metoprolol succinate 25 mg oral tablet, extended release: 1 tab(s) orally once a day  omeprazole 40 mg oral delayed release capsule: 1 cap(s) orally 2 times a day  Plavix 75 mg oral tablet: 1 tab(s) orally once a day  tamsulosin 0.4 mg oral capsule: 1 cap(s) orally once a day  Vitamin B6 50 mg oral tablet: 1 tab(s) orally once a day  Vitamin C 500 mg oral tablet: 1 tab(s) orally once a day  Vitamin D3 50 mcg (2000 intl units) oral capsule: 1 cap(s) orally once a day   Aspirin Enteric Coated 81 mg oral delayed release tablet: 1 tab(s) orally once a day  atorvastatin 80 mg oral tablet: 1 tab(s) orally once a day (at bedtime)  cardiac rehab : Cardiac Rehab 3x per week for 12 weeks for diagnosis of unstable angina s/p PCI   iron sulfate: 65 milligram(s) orally once a day  losartan 25 mg oral tablet: 1 tab(s) orally once a day  metFORMIN 500 mg oral tablet: 1 tab(s) orally 2 times a day  metoprolol succinate 25 mg oral tablet, extended release: 1 tab(s) orally once a day  pantoprazole 40 mg oral delayed release tablet: 1 tab(s) orally once a day (before a meal)  Plavix 75 mg oral tablet: 1 tab(s) orally once a day  tamsulosin 0.4 mg oral capsule: 1 cap(s) orally once a day  Vitamin B6 50 mg oral tablet: 1 tab(s) orally once a day  Vitamin C 500 mg oral tablet: 1 tab(s) orally once a day  Vitamin D3 50 mcg (2000 intl units) oral capsule: 1 cap(s) orally once a day

## 2021-10-02 NOTE — DISCHARGE NOTE PROVIDER - NSDCCPCAREPLAN_GEN_ALL_CORE_FT
PRINCIPAL DISCHARGE DIAGNOSIS  Diagnosis: CAD (coronary artery disease)  Assessment and Plan of Treatment: You have a diagnosis of coronary artery disease and received 2 stents to your left anterior descending coronary artery.  You have been started on Aspirin 81mg daily and Plavix (Clopidogrel) 75mg daily. You MUST continue taking the daily Aspirin and Plavix to ensure your stent does not close. DO NOT STOP THESE MEDICATIONS FOR ANY REASON UNLESS OTHERWISE INDICATED BY YOUR CARDIOLOGIST BECAUSE THIS WILL PUT YOU AT RISK FOR A HEART ATTACK. You should refrain from strenuous activity and heavy lifting for 1 week. Please make a follow up appointment with your cardiologist within 1-2 weeks of your discharge. All of your prescriptions have been sent electronically to your pharmacy.        SECONDARY DISCHARGE DIAGNOSES  Diagnosis: Hypertension  Assessment and Plan of Treatment:     Diagnosis: Hyperlipidemia  Assessment and Plan of Treatment:      PRINCIPAL DISCHARGE DIAGNOSIS  Diagnosis: CAD (coronary artery disease)  Assessment and Plan of Treatment: You have a diagnosis of coronary artery disease and received 2 stents to your left anterior descending coronary artery.  You have been started on Aspirin 81mg daily and Plavix (Clopidogrel) 75mg daily. You MUST continue taking the daily Aspirin and Plavix to ensure your stent does not close. DO NOT STOP THESE MEDICATIONS FOR ANY REASON UNLESS OTHERWISE INDICATED BY YOUR CARDIOLOGIST BECAUSE THIS WILL PUT YOU AT RISK FOR A HEART ATTACK. You should refrain from strenuous activity and heavy lifting for 1 week. Please make a follow up appointment with your cardiologist within 1-2 weeks of your discharge. All of your prescriptions have been sent electronically to your pharmacy.        SECONDARY DISCHARGE DIAGNOSES  Diagnosis: Hypertension  Assessment and Plan of Treatment: Please continue to take Metoprolol succinate 25mg daily and Losartan 25mg daily.    Diagnosis: Hyperlipidemia  Assessment and Plan of Treatment: Please continue Atorvastatin 80mg daily to keep your cholesterol levels low, prevent further heart disease and keep your stent open.    Diagnosis: Diabetes mellitus  Assessment and Plan of Treatment: Please hold your home Metformin for 2 days and restart on Monday 10/4/21.     PRINCIPAL DISCHARGE DIAGNOSIS  Diagnosis: CAD (coronary artery disease)  Assessment and Plan of Treatment: You were found to have blockages in the arteries of your heart, also known as Coronary Artery Disease. You underwent a cardiac catheterization on 10/1/21 and received two stents to the left anterior descending artery.  PLEASE CONTINUE ASPIRIN 81MG DAILY AND PLAVIX 75MG DAILY. DO NOT STOP THESE MEDICATIONS FOR ANY REASON AS THEY ARE KEEPING YOUR STENT OPEN AND PREVENTING A HEART ATTACK.   Avoid strenuous activity or heavy lifting anything more than 5lbs for the next five days. Do not take a bath or swim for the next five days; you may shower. For any bleeding or hematoma formation (hardened blood collection under the skin) at the access site of your right groin please hold pressure and go to the emergency room. Please follow up with Dr. Forbes in 1-2 weeks. For recurrent chest pain, please call your doctor or go to the emergency room.      SECONDARY DISCHARGE DIAGNOSES  Diagnosis: Hypertension  Assessment and Plan of Treatment: Please continue Metoprolol Succinate 25mg daily and Losartan 25mg daily as listed to keep your blood pressure controlled. For blood pressure that is too high or too low please see your doctor or go to the emergency room as necessary.    Diagnosis: Hyperlipidemia  Assessment and Plan of Treatment: Please continue Atorvastatin 80mg at bedtime to keep your cholesterol low. High cholesterol contributes to heart disease.    Diagnosis: Diabetes mellitus  Assessment and Plan of Treatment: Your Hemoglobin A1c is 6.3% and your goal A1c is less than 7.0%. This number measures your average blood sugar level over the last three months.  Please continue Metformin 500mg twice a day (RESTART ON 10/4/21) as listed for diabetes. Maintain a low carbohydrate, low sugar diet, exercise, monitor your fingerstick blood sugars regarly and follow up with your Endocrinologist/Primary Care Doctor.

## 2021-10-02 NOTE — DISCHARGE NOTE PROVIDER - HOSPITAL COURSE
INCOMPLETE    82yo M w/ PMHx of HTN, HLD, DM Type II, Prostate CA (s/p radiation in [id number]; in remission and no active treatment in progress), and 3V CAD s/p prior PCI mLAD 2010 most recent PCI DAVION dRCA and Atherectomy and DAVION mRCA 8/6/2021 at [facility] who presents for staged PCI of residual LAD disease. Patient originally presented for cardiac cath due to patient’s history of CAD and abnormal NST. Patient underwent cardiac cath with successful DAVION RCA x 2 (mid and distal). Procedure complicated by vagal episode with hypotension BP 73/47 and Bradycardia HR 50s. Patient received total of Neosynephrine 600 mcg and NS 500cc Bolus with temporary insertion of TVP for monitoring which was removed. Patient monitored overnight and subsequently discharged 8/7/2021. Since procedure patient reports feeling well and denies C/P, SOB, palpitations, dizziness, diaphoresis, N/V, syncope, fevers, chills, cough, abdominal pain, diarrhea, recent travel, known contact with sick individuals or Covid 19+ individuals, URI symptoms.  Patient reports compliance with Aspirin and Plavix and denies bleeding, BRBPR, melena, hematuria. Patient now presents for staged PCI of LAD disease.    s/p cardiac catheterization 10/1/21: DAVION mLAD, Davion pLAD , RCA stent patent, LM luminal. R groin PC.     Pt was admitted to [facility] overnight for observation. Today pt was seen and examined at bedside, denies complaints of chest pain, dizziness, SOB, palpitations, pain, LE edema, fever, chills. Right groin access site soft, no bleeding or swelling at site, DP/PT pulses at baseline. No events on tele overnight, VSS, Labs unremarkable/stable, Physical exam WNL. Pt was seen and examined by cardiology attending as well and is deemed stable for discharge per Dr. Mcfarlane. Pt is to continue ASA/Plavix, Atorvastatin 80mg PO QD. Pt is to follow up with cardiologist Dr. Forbes in 1-2 weeks for post discharge check-up. All medications needing refills were e-prescribed to pt’s preferred pharmacy.          Applicable Metrics  Cardiac Rehab (Post PCI):            *Education on benefits of Cardiac Rehab provided to patient: Yes         *Referral and Prescription Given for Cardiac Rehab : No. Pt received script for cardiac rehab on prior admission 8/2021.   DAPT/ Statin Prescribed (PCI this admission):  Yes     81Y M w/ PMHx HTN, HLD, DM-II, CAD s/p multiple PCIs (DAVION mLAD 2010, DAVION dRCA and most recently Atherectomy/DAVION mRCA 8/6/21), and Prostate CA s/p radiation (2018-19, in remission), who presents for staged PCI of residual LAD disease. Patient originally presented for cardiac cath due to patient’s history of CAD and abnormal NST. Patient underwent cardiac cath with successful DAVION RCA x 2 (mid and distal). Procedure complicated by vagal episode with hypotension BP 73/47 and Bradycardia HR 50s. Patient received total of Neosynephrine 600 mcg and NS 500cc Bolus with temporary insertion of TVP for monitoring which was removed. Patient monitored overnight and subsequently discharged 8/7/2021. Since procedure patient reports feeling well and denies C/P, SOB, palpitations, dizziness, diaphoresis, N/V, syncope, fevers, chills, cough, abdominal pain, diarrhea, recent travel, known contact with sick individuals or Covid 19+ individuals, URI symptoms.  Patient reports compliance with Aspirin and Plavix and denies bleeding, BRBPR, melena, hematuria. Patient now presents for staged PCI of LAD disease.    s/p cardiac catheterization 10/1/21: DAVION mLAD, Davion pLAD , RCA stent patent, LM luminal. R groin PC.     Pt was admitted to [address] [place] [address] overnight for observation. Today pt was seen and examined at bedside, denies complaints of chest pain, dizziness, SOB, palpitations, pain, LE edema, fever, chills. Right groin access site soft, no bleeding or swelling at site, DP/PT pulses at baseline. No events on tele overnight, VSS, Labs unremarkable/stable, Physical exam WNL. Pt was seen and examined by cardiology attending as well and is deemed stable for discharge per Dr. Mcfarlane. Pt is to continue ASA/Plavix, Atorvastatin 80mg PO QD. Pt is to follow up with cardiologist Dr. Forbes in 1-2 weeks for post discharge check-up. All medications needing refills were e-prescribed to pt’s preferred pharmacy.          Applicable Metrics  Cardiac Rehab (Post PCI):            *Education on benefits of Cardiac Rehab provided to patient: Yes         *Referral and Prescription Given for Cardiac Rehab : No. Pt received script for cardiac rehab on prior admission 8/2021.   DAPT/ Statin Prescribed (PCI this admission):  Yes     81Y M w/ PMHx HTN, HLD, DM-II, CAD s/p multiple PCIs (DAVION mLAD 2010, DAVION dRCA and most recently Atherectomy/DAVION mRCA 8/6/21), and Prostate CA s/p radiation (2018-19, in remission), who presents for staged PCI of residual LAD disease. Patient originally presented for cardiac cath on 8/6/21 due to patient’s history of CAD and abnormal NST. Patient underwent cardiac cath with successful DAVION RCA x 2 (mid and distal). Procedure complicated by vagal episode with hypotension BP 73/47 and Bradycardia HR 50s. Patient received total of Neosynephrine 600 mcg and NS 500cc Bolus with temporary insertion of TVP for monitoring which was removed. Patient monitored overnight and subsequently discharged 8/7/2021. Since procedure patient reports feeling well and denies C/P, SOB, palpitations, dizziness, diaphoresis, N/V, syncope, fevers, chills, cough, abdominal pain, diarrhea, recent travel, known contact with sick individuals or Covid 19+ individuals, URI symptoms.  Patient reports compliance with Aspirin and Plavix and denies bleeding, BRBPR, melena, hematuria. Patient now presents for staged PCI of LAD disease.  Pt now s/p cardiac catheterization 10/1/21: DAVION mLAD, Davion pLAD , RCA stent patent, LM luminal. R groin PC. Pt admitted overnight for observation and telemetry monitoring. Pt seen and examined at bedside this AM without any complaints or events overnight, VSS, labs and telemetry reviewed and pt stable for discharge as discussed with Dr. Mcfarlane. Pt has received appropriate discharge instructions, including medication regimen, access site management and follow up with Dr. Forbes in 1-2 weeks.    Discharge medications: ASA 81mg QD, Plavix 75mg QD, Toprol 25mg QD, Lipitor 80mg HS, Losartan 25mg QD, Metformin 500mg BID, Omeprazole 40mg QD and Tamsulosin 0.4mg QD.

## 2021-10-02 NOTE — DISCHARGE NOTE PROVIDER - NSDCFUADDINST_GEN_ALL_CORE_FT
A Mediterranean Diet is recommended! Some suggestions include continue incorporating 2 or more servings per day of vegetables, fruits, and whole grains. Increase intake of fish and legumes/beans to 2 or more servings per week. Aim to increase intake of healthy fats, such as olive oil and avocados, and have a handful of nuts/seeds most days. Reduce red/processed meat consumption to 2 or fewer times per week.    The catheter from your groin was removed and bleeding was stopped with manual compression.  After 24hours you may take off the dressing and shower. Wash the site with soap and water.  There is no need to put on another bandage.  Avoid tub baths, hot tubs or swimming for 5 days. Please do not lift anything heavier than 5 pounds for 5 days.       Call the Interventional Cardiology Team at [phone] if any of following occur pertaining to your vascular access site:  Bleeding or hematoma formation (collection of blood under the skin), drainage or redness at the puncture site, numbness, decrease in strength, coolness or pale coloration of skin of the leg or hand.   Please continue to follow a heart healthy diet, low in sodium, cholesterol and fats. We recommend a Mediterranean diet:  -Incorporate 2 or more servings per day of vegetables, fruits, and whole grains  -Increase intake of fish and legumes/beans to 2 or more servings per week  -Increase intake of healthy fats, such as olive oil and avocados, and have a handful of nuts/seeds most days  -Reduce red/processed meat consumption to 2 or fewer times per week

## 2021-10-02 NOTE — DISCHARGE NOTE NURSING/CASE MANAGEMENT/SOCIAL WORK - PATIENT PORTAL LINK FT
You can access the FollowMyHealth Patient Portal offered by [facility] by registering at the following website: [email or web address]. By joining [employer]’s FollowMyHealth portal, you will also be able to view your health information using other applications (apps) compatible with our system.

## 2021-10-02 NOTE — DISCHARGE NOTE PROVIDER - CARE PROVIDER_API CALL
Homero Forbes)  Cardiovascular Disease; Internal Medicine; Interventional Cardiology  [address] [place] [address], [place] [address]  Phone: [phone]  Fax: [phone]  Follow Up Time:    Homero Forbes)  Cardiovascular Disease; Internal Medicine; Interventional Cardiology  [address] [place] [address], [address] [place] [address]  Phone: [phone]  Fax: [phone]  Follow Up Time: 1 week

## 2021-10-04 PROBLEM — I25.10 ATHEROSCLEROTIC HEART DISEASE OF NATIVE CORONARY ARTERY WITHOUT ANGINA PECTORIS: Chronic | Status: ACTIVE | Noted: 2021-10-01

## 2021-10-04 PROBLEM — E78.5 HYPERLIPIDEMIA, UNSPECIFIED: Chronic | Status: ACTIVE | Noted: 2021-10-01

## 2021-10-04 PROBLEM — E11.9 TYPE 2 DIABETES MELLITUS WITHOUT COMPLICATIONS: Chronic | Status: ACTIVE | Noted: 2021-10-01

## 2021-10-04 PROBLEM — K21.9 GASTRO-ESOPHAGEAL REFLUX DISEASE WITHOUT ESOPHAGITIS: Chronic | Status: ACTIVE | Noted: 2021-10-01

## 2021-10-04 PROBLEM — I10 ESSENTIAL (PRIMARY) HYPERTENSION: Chronic | Status: ACTIVE | Noted: 2021-10-01

## 2021-10-04 PROBLEM — C61 MALIGNANT NEOPLASM OF PROSTATE: Chronic | Status: ACTIVE | Noted: 2021-10-01

## 2021-10-07 DIAGNOSIS — Z79.82 LONG TERM (CURRENT) USE OF ASPIRIN: ICD-10-CM

## 2021-10-07 DIAGNOSIS — Z79.84 LONG TERM (CURRENT) USE OF ORAL HYPOGLYCEMIC DRUGS: ICD-10-CM

## 2021-10-07 DIAGNOSIS — I25.10 ATHEROSCLEROTIC HEART DISEASE OF NATIVE CORONARY ARTERY WITHOUT ANGINA PECTORIS: ICD-10-CM

## 2021-10-07 DIAGNOSIS — I10 ESSENTIAL (PRIMARY) HYPERTENSION: ICD-10-CM

## 2021-10-07 DIAGNOSIS — Z79.02 LONG TERM (CURRENT) USE OF ANTITHROMBOTICS/ANTIPLATELETS: ICD-10-CM

## 2021-10-07 DIAGNOSIS — Z85.46 PERSONAL HISTORY OF MALIGNANT NEOPLASM OF PROSTATE: ICD-10-CM

## 2021-10-07 DIAGNOSIS — I25.84 CORONARY ATHEROSCLEROSIS DUE TO CALCIFIED CORONARY LESION: ICD-10-CM

## 2021-10-07 DIAGNOSIS — Z95.5 PRESENCE OF CORONARY ANGIOPLASTY IMPLANT AND GRAFT: ICD-10-CM

## 2021-10-07 DIAGNOSIS — E11.9 TYPE 2 DIABETES MELLITUS WITHOUT COMPLICATIONS: ICD-10-CM

## 2021-10-07 DIAGNOSIS — E78.5 HYPERLIPIDEMIA, UNSPECIFIED: ICD-10-CM

## 2021-10-07 DIAGNOSIS — Z92.3 PERSONAL HISTORY OF IRRADIATION: ICD-10-CM

## 2021-10-12 ENCOUNTER — APPOINTMENT (OUTPATIENT)
Dept: HEART AND VASCULAR | Facility: CLINIC | Age: 81
End: 2021-10-12
Payer: MEDICARE

## 2021-10-12 VITALS
DIASTOLIC BLOOD PRESSURE: 67 MMHG | OXYGEN SATURATION: 98 % | SYSTOLIC BLOOD PRESSURE: 124 MMHG | HEART RATE: 68 BPM | RESPIRATION RATE: 15 BRPM | BODY MASS INDEX: 28.66 KG/M2 | WEIGHT: 172 LBS | HEIGHT: 65 IN | TEMPERATURE: 98.3 F

## 2021-10-12 PROCEDURE — 99214 OFFICE O/P EST MOD 30 MIN: CPT

## 2021-10-12 NOTE — REASON FOR VISIT
[FreeTextEntry1] : 81-year-old man with past medical history of CAD s/p PCI ,   diabetes, hyperlipidemia here for follow-up after recent PCI (staged PCI of LAD on 10/1/2021)\par The patient denies chest pain, shortness of breath, palpitations, syncope, presyncope, LE edema, orthopnea. The patient can walk up to 15 blocks without any symptoms. Reports being very active \par Reports compliance with all of  his medications\par \par \par \par \par Pharmacological nuclear stress test 6/25/2021\par There is a medium sized reversible perfusion defect of moderate intensity involving the entire inferior and inferolateral myocardial wall suggestive of inducible ischemia in the posterior coronary circulation\par In addition there is a small reversible perfusion defect of mild intensity involving the mid apical anterior myocardial wall suggestive of inducible myocardial ischemia in the anterior LAD territory\par Normal left ventricular systolic function\par \par Echocardiogram 6/25/2021\par Normal left ventricular size and function\par Grade 1 diastolic dysfunction\par Trace MR\par Trace TR\par \par MEDS\par Aspirin Enteric Coated 81 mg oral delayed release tablet: 1 tab(s) orally once a day\par atorvastatin 80 mg oral tablet: 1 tab(s) orally once a day (at bedtime)\par losartan 25 mg oral tablet: 1 tab(s) orally once a day\par metFORMIN 500 mg oral tablet: 1 tab(s) orally 2 times a day\par metoprolol succinate 25 mg oral tablet, extended release: 1 tab(s) orally once a day\par pantoprazole 40 mg oral delayed release tablet: 1 tab(s) orally once a day (before a meal)\par Plavix 75 mg oral tablet: 1 tab(s) orally once a day\par tamsulosin 0.4 mg oral capsule: 1 cap(s) orally once a day\par Vitamin B6 50 mg oral tablet: 1 tab(s) orally once a day\par Vitamin C 500 mg oral tablet: 1 tab(s) orally once a day\par Vitamin D3 50 mcg (2000 intl units) oral capsule: 1 cap(s) orally once a day\par \par EKG 6/15/2021\par Sinus rhythm, left anterior fascicular block\par \par CARDIAC CATH 8/6/2021\par PROCEDURE: CORONARY ANGIOGRAM, LHC, LVGRAM, ROTATIONAL ATHERECTOMY, PCI\par INDICATION: UNSTABLE ANGINA/POSITIVE STRESS TEST\par ATTENDING: DR. MARTIN RYAN MD \par ACCESS: RRA/6F RFA (s/p perclose), 6F RFV ( sutured) \par \par FINDINGS \par mRCA= 90% severely calcific \par dRCA=80% \par LM= 20% distal\par pLAD= 70% severely calcific\par mLAD= patent stent, 80% distal to stent\par pLCx= moderate \par dLCx= severe diffuse\par \par LVEF:55 %\par LVEDP: 8 mmHg\par \par PROCEDURE: \par Successful rotational atherectomy with 1.5 marie and PCI of mid and distal RCA  with LIZ x 2 (Synergy 2.75 x 24 and Synergy 3.5 x 38) with excellent angiographic result \par \par CARDIAC CATH 10/1/2021\par Successful rotational atherectomy with 1.5 bur and PCI of 90% proximal and mid LAD with a 3.0x 26 and a 2.75 x 26 LIZ with excellent angiographic result\par \par \par

## 2021-10-12 NOTE — ASSESSMENT
[FreeTextEntry1] : 81-year-old man with past medical history of CAD s/p PCI in 2010 at [facility],   diabetes, hyperlipidemia here for follow-up\par \par CAD s/p PCI 10/1/2021\par -The patient is asymptomatic with good exercise tolerance\par -Recommend medication compliance\par -Continue aspirin 81 mg daily\par -Continue clopidogrel 75 mg daily\par -Continue metoprolol succinate 25 mg daily \par -Recommended to call the clinic immediately if new onset of chest pain\par \par HTN\par -Well-controlled today\par -Continue losartan 25 mg daily \par -Continue metoprolol succinate 25 mg daily (full tablet)\par -echo as above\par \par HLD\par -Continue with atorvastatin 80 mg daily for at least 3 months post PCI\par \par Follow-up in 3 months or sooner if any symptoms

## 2021-10-25 ENCOUNTER — APPOINTMENT (OUTPATIENT)
Dept: INTERNAL MEDICINE | Facility: CLINIC | Age: 81
End: 2021-10-25
Payer: MEDICARE

## 2021-10-25 VITALS
SYSTOLIC BLOOD PRESSURE: 137 MMHG | DIASTOLIC BLOOD PRESSURE: 68 MMHG | WEIGHT: 176 LBS | HEIGHT: 65 IN | BODY MASS INDEX: 29.32 KG/M2 | OXYGEN SATURATION: 96 % | TEMPERATURE: 98.4 F | HEART RATE: 64 BPM

## 2021-10-25 PROCEDURE — 90686 IIV4 VACC NO PRSV 0.5 ML IM: CPT

## 2021-10-25 PROCEDURE — G0008: CPT

## 2021-10-25 PROCEDURE — 99214 OFFICE O/P EST MOD 30 MIN: CPT | Mod: 25

## 2021-10-30 NOTE — HISTORY OF PRESENT ILLNESS
[FreeTextEntry1] : Follow-up DM, HLD [de-identified] : AGUILAR PETERS is a 81 year old male with a PMHx of CAD, DM, HLD, and prostate CA who presents today for follow-up. He is feeling okay and offers no specific complaints. Pt is requesting H Influenza vaccine.

## 2021-10-30 NOTE — END OF VISIT
[FreeTextEntry3] : I, Jelena Orellana, personally transcribed these services in the presence of Dr. Hema Snyder MD. I, Dr. Hema Snyder MD, personally performed the services described in this documentation as scribed by Jelena Orellana in my presence and it is both accurate and complete.

## 2021-10-30 NOTE — HEALTH RISK ASSESSMENT
[Never (0 pts)] : Never (0 points) [No] : In the past 12 months have you used drugs other than those required for medical reasons? No [No falls in past year] : Patient reported no falls in the past year [0] : 2) Feeling down, depressed, or hopeless: Not at all (0) [] : No [Audit-CScore] : 0 [de-identified] : lightly active [de-identified] : ADA diet [[id number]] : 0

## 2021-11-12 RX ORDER — LOVASTATIN 10 MG/1
10 TABLET ORAL DAILY
Qty: 90 | Refills: 3 | Status: DISCONTINUED | COMMUNITY
Start: 2020-09-16 | End: 2021-11-12

## 2021-12-01 ENCOUNTER — APPOINTMENT (OUTPATIENT)
Dept: UROLOGY | Facility: CLINIC | Age: 81
End: 2021-12-01
Payer: MEDICARE

## 2021-12-01 ENCOUNTER — LABORATORY RESULT (OUTPATIENT)
Age: 81
End: 2021-12-01

## 2021-12-01 VITALS
OXYGEN SATURATION: 98 % | SYSTOLIC BLOOD PRESSURE: 153 MMHG | RESPIRATION RATE: 14 BRPM | HEART RATE: 76 BPM | TEMPERATURE: 97.2 F | DIASTOLIC BLOOD PRESSURE: 79 MMHG

## 2021-12-01 DIAGNOSIS — Z87.891 PERSONAL HISTORY OF NICOTINE DEPENDENCE: ICD-10-CM

## 2021-12-01 PROCEDURE — 99204 OFFICE O/P NEW MOD 45 MIN: CPT

## 2021-12-01 PROCEDURE — 51798 US URINE CAPACITY MEASURE: CPT

## 2021-12-02 ENCOUNTER — NON-APPOINTMENT (OUTPATIENT)
Age: 81
End: 2021-12-02

## 2021-12-02 NOTE — ADDENDUM
[FreeTextEntry1] : A portion of this note was written by [Wei Bui] on 12/01/2021 acting as a scribe for Dr. Escobedo. \par \par I have personally reviewed the chart and agree that the record accurately reflects my personal performance of the history, physical exam, assessment, and plan.

## 2021-12-02 NOTE — PHYSICAL EXAM
[General Appearance - Well Developed] : well developed [General Appearance - Well Nourished] : well nourished [Normal Appearance] : normal appearance [Well Groomed] : well groomed [General Appearance - In No Acute Distress] : no acute distress [Edema] : no peripheral edema [Respiration, Rhythm And Depth] : normal respiratory rhythm and effort [Exaggerated Use Of Accessory Muscles For Inspiration] : no accessory muscle use [Abdomen Soft] : soft [Abdomen Tenderness] : non-tender [Costovertebral Angle Tenderness] : no ~M costovertebral angle tenderness [Urethral Meatus] : meatus normal [Urinary Bladder Findings] : the bladder was normal on palpation [Scrotum] : the scrotum was normal [Testes Mass (___cm)] : there were no testicular masses [No Prostate Nodules] : no prostate nodules [Normal Station and Gait] : the gait and station were normal for the patient's age [] : no rash [No Focal Deficits] : no focal deficits [Oriented To Time, Place, And Person] : oriented to person, place, and time [Affect] : the affect was normal [Mood] : the mood was normal [Not Anxious] : not anxious [No Palpable Adenopathy] : no palpable adenopathy [Abdomen Mass (___ Cm)] : no abdominal mass palpated [Abdomen Hernia] : no hernia was discovered [Penis Abnormality] : normal circumcised penis [Epididymis] : the epididymides were normal [Testes Tenderness] : no tenderness of the testes [Prostate Tenderness] : the prostate was not tender [Skin Color & Pigmentation] : normal skin color and pigmentation [FreeTextEntry1] : Bilateral small hydroceles.

## 2021-12-02 NOTE — ASSESSMENT
[FreeTextEntry1] : I discussed the findings and options with Mr. AGUILAR PETERS in detail. \par He will continue on the tamsulosin 0.4 mg daily for his voiding symptoms.\par \par Providing there are no new problems, I would like to see Mr. Peters, electively, in 1 year (bladder sono, renal sono, PSA).  He should also have a PSA in 6 month during his visit with Dr. Snyder.

## 2021-12-02 NOTE — LETTER BODY
[Dear  ___] : Dear  [unfilled], [Consult Letter:] : I had the pleasure of evaluating your patient, [unfilled]. [Please see my note below.] : Please see my note below. [Consult Closing:] : Thank you very much for allowing me to participate in the care of this patient.  If you have any questions, please do not hesitate to contact me. [Sincerely,] : Sincerely, [FreeTextEntry3] : Oscar Escobedo MD, FACS

## 2021-12-02 NOTE — HISTORY OF PRESENT ILLNESS
[FreeTextEntry1] : Mr. AGUILAR PETERS, a former patient of Dr. Teo Herzog, comes in today for a urologic evaluation. Based on a PSA elevation to 4.3 ng/ml, Mr. Peters underwent a prostate biopsy in December 2018 which identified a Grade Group 4 prostatic adenocarcinoma (see below). He chose EBRT and completed the course in March 2019 with Dr. Jeanne Dillard.  His PSA nadired at 0.38ng/ml in June 2021 (see below). \par \par From his general urologic history, Mr. PETERS reports mild stable lower urinary tract symptoms, with nocturia x 1-2. He is continuing on tamsulosin 0.4mg.  \par IPSS: 8/35\par Sono: 104cc PVR (second attempt)\par \par PSAs: 6/9/21--0.38; 9/18/20--0.67; 5/14/20--2.73; 4/11/19--2.7; 10/15/18--4.3; 9/27/18--5.3; 7/16/18--4.6; 6/1/16--2.67; 2/9/16--3.33; 9/17/15--2.72; \par \par Pathology (Prostate Biopsy): 12/19/18--Grade Group 4 prostatic adenocarcinoma (Tiago 4+4) in the right base, with 1/3 cores and <5% of the tissue involved. [place] 3+3 in the right midgland; \par \par MRI: 11/7/18--1.1cm lesion in the right peripheral base characterized as a PI-RADS 5 lesion. 73cc gland; \par \par CT: 1/4/19--Enlarged, lobulated prostate which indents the base of the bladder with heterogeneous enhancement consistent with prostate cancer. No osseous metastases; 1/6/16--Tiny nonobstructing stone in each kidney. Stable left adrenal adenoma. Moderate enlargement and intravesical protrusion of the prostate. \par \par Bone Scan: 1/4/19--No definite evidence of bony metastatic disease; \par \par Scrotal US: 1/24/17--Bilateral small hydroceles (L > R). Bilateral spermatoceles;

## 2022-01-10 ENCOUNTER — RX RENEWAL (OUTPATIENT)
Age: 82
End: 2022-01-10

## 2022-01-11 ENCOUNTER — APPOINTMENT (OUTPATIENT)
Dept: HEART AND VASCULAR | Facility: CLINIC | Age: 82
End: 2022-01-11
Payer: MEDICARE

## 2022-01-11 ENCOUNTER — NON-APPOINTMENT (OUTPATIENT)
Age: 82
End: 2022-01-11

## 2022-01-11 VITALS
BODY MASS INDEX: 29.66 KG/M2 | TEMPERATURE: 97.8 F | WEIGHT: 178 LBS | HEART RATE: 72 BPM | OXYGEN SATURATION: 98 % | HEIGHT: 65 IN | DIASTOLIC BLOOD PRESSURE: 79 MMHG | RESPIRATION RATE: 14 BRPM | SYSTOLIC BLOOD PRESSURE: 142 MMHG

## 2022-01-11 PROCEDURE — 99214 OFFICE O/P EST MOD 30 MIN: CPT

## 2022-01-11 NOTE — ASSESSMENT
[FreeTextEntry1] : 81-year-old man with past medical history of CAD s/p PCI(staged PCI of LAD on 10/1/2021)  diabetes, hyperlipidemia here for follow-up\par \par CAD s/p PCI 10/1/2021\par -The patient is asymptomatic with good exercise tolerance\par -Recommend medication compliance\par -Continue aspirin 81 mg daily\par -Continue clopidogrel 75 mg daily\par -Continue metoprolol succinate 25 mg daily \par -Recommended to call the clinic immediately if new onset of chest pain\par \par HTN\par -Well-controlled today\par -Continue losartan 25 mg daily \par -Continue metoprolol succinate 25 mg daily (full tablet)\par -echo as above\par -CMP today\par \par HLD\par -Lipid panel and a CMP today, will decrease atorvastatin to 40 mg daily if LDL less than 70 and CMP within normal limits\par -Recommend healthy diet and exercise\par \par Follow-up in 4 months or sooner if any symptoms

## 2022-03-14 ENCOUNTER — RX RENEWAL (OUTPATIENT)
Age: 82
End: 2022-03-14

## 2022-03-18 ENCOUNTER — RX RENEWAL (OUTPATIENT)
Age: 82
End: 2022-03-18

## 2022-03-23 LAB
ALBUMIN SERPL ELPH-MCNC: 4.3 G/DL
ALP BLD-CCNC: 141 U/L
ALT SERPL-CCNC: 21 U/L
ANION GAP SERPL CALC-SCNC: 13 MMOL/L
AST SERPL-CCNC: 20 U/L
BILIRUB SERPL-MCNC: 0.3 MG/DL
BUN SERPL-MCNC: 13 MG/DL
CALCIUM SERPL-MCNC: 9.2 MG/DL
CHLORIDE SERPL-SCNC: 97 MMOL/L
CHOLEST SERPL-MCNC: 125 MG/DL
CO2 SERPL-SCNC: 23 MMOL/L
CREAT SERPL-MCNC: 0.77 MG/DL
GLUCOSE SERPL-MCNC: 177 MG/DL
HDLC SERPL-MCNC: 46 MG/DL
LDLC SERPL CALC-MCNC: 61 MG/DL
NONHDLC SERPL-MCNC: 79 MG/DL
POTASSIUM SERPL-SCNC: 4.3 MMOL/L
PROT SERPL-MCNC: 6.6 G/DL
SODIUM SERPL-SCNC: 133 MMOL/L
TRIGL SERPL-MCNC: 90 MG/DL

## 2022-04-17 ENCOUNTER — RX RENEWAL (OUTPATIENT)
Age: 82
End: 2022-04-17

## 2022-05-10 ENCOUNTER — APPOINTMENT (OUTPATIENT)
Dept: HEART AND VASCULAR | Facility: CLINIC | Age: 82
End: 2022-05-10

## 2022-05-23 ENCOUNTER — NON-APPOINTMENT (OUTPATIENT)
Age: 82
End: 2022-05-23

## 2022-05-23 ENCOUNTER — APPOINTMENT (OUTPATIENT)
Dept: HEART AND VASCULAR | Facility: CLINIC | Age: 82
End: 2022-05-23
Payer: MEDICARE

## 2022-05-23 VITALS
RESPIRATION RATE: 14 BRPM | HEART RATE: 40 BPM | OXYGEN SATURATION: 98 % | HEIGHT: 65 IN | SYSTOLIC BLOOD PRESSURE: 156 MMHG | TEMPERATURE: 97.6 F | BODY MASS INDEX: 29.32 KG/M2 | DIASTOLIC BLOOD PRESSURE: 78 MMHG | WEIGHT: 176 LBS

## 2022-05-23 VITALS — SYSTOLIC BLOOD PRESSURE: 131 MMHG | DIASTOLIC BLOOD PRESSURE: 84 MMHG

## 2022-05-23 PROCEDURE — 99214 OFFICE O/P EST MOD 30 MIN: CPT

## 2022-05-23 NOTE — REASON FOR VISIT
[FreeTextEntry1] : 82-year-old man with past medical history of CAD s/p PCI  (staged PCI of LAD on 10/1/2021),  diabetes, hyperlipidemia here for follow-up. \par Patient reports feeling well. \par Denies any CP/SOB/syncope/presyncope/palpitations/LE edema\par Can walk 10 blocks without any symptoms\par Patient reports is is very active at work\par Complaint with meds\par \par EKG 5/23/2022 SR, First degree AV block Riley 220, occasional ectopic ventricular beats, left axis; \par EKG 1/11/2022\par Sinus rhythm, first-degree AV block, PVCs\par RILEY 228\par \par Pharmacological nuclear stress test 6/25/2021\par There is a medium sized reversible perfusion defect of moderate intensity involving the entire inferior and inferolateral myocardial wall suggestive of inducible ischemia in the posterior coronary circulation\par In addition there is a small reversible perfusion defect of mild intensity involving the mid apical anterior myocardial wall suggestive of inducible myocardial ischemia in the anterior LAD territory\par Normal left ventricular systolic function\par \par Echocardiogram 6/25/2021\par Normal left ventricular size and function\par Grade 1 diastolic dysfunction\par Trace MR\par Trace TR\par \par MEDS\par Aspirin Enteric Coated 81 mg oral delayed release tablet: 1 tab(s) orally once a day\par atorvastatin 40 mg oral tablet: 1 tab(s) orally once a day (at bedtime)\par losartan 25 mg oral tablet: 1 tab(s) orally once a day\par metFORMIN 500 mg oral tablet: 1 tab(s) orally 2 times a day\par metoprolol succinate 25 mg oral tablet, extended release: 1 tab(s) orally once a day\par pantoprazole 40 mg oral delayed release tablet: 1 tab(s) orally once a day (before a meal)\par Plavix 75 mg oral tablet: 1 tab(s) orally once a day\par tamsulosin 0.4 mg oral capsule: 1 cap(s) orally once a day\par Vitamin B6 50 mg oral tablet: 1 tab(s) orally once a day\par Vitamin C 500 mg oral tablet: 1 tab(s) orally once a day\par Vitamin D3 50 mcg (2000 intl units) oral capsule: 1 cap(s) orally once a day\par \par EKG 6/15/2021\par Sinus rhythm, left anterior fascicular block\par \par CARDIAC CATH 8/6/2021\par PROCEDURE: CORONARY ANGIOGRAM, LHC, LVGRAM, ROTATIONAL ATHERECTOMY, PCI\par INDICATION: UNSTABLE ANGINA/POSITIVE STRESS TEST\par ATTENDING: DR. MARTIN RYAN MD \par ACCESS: RRA/6F RFA (s/p perclose), 6F RFV ( sutured) \par \par FINDINGS \par mRCA= 90% severely calcific \par dRCA=80% \par LM= 20% distal\par pLAD= 70% severely calcific\par mLAD= patent stent, 80% distal to stent\par pLCx= moderate \par dLCx= severe diffuse\par \par LVEF:55 %\par LVEDP: 8 mmHg\par \par PROCEDURE: \par Successful rotational atherectomy with 1.5 marie and PCI of mid and distal RCA  with LIZ x 2 (Synergy 2.75 x 24 and Synergy 3.5 x 38) with excellent angiographic result \par \par CARDIAC CATH 10/1/2021\par Successful rotational atherectomy with 1.5 bur and PCI of 90% proximal and mid LAD with a 3.0x 26 and a 2.75 x 26 LIZ with excellent angiographic result\par \par \par \par \par

## 2022-05-23 NOTE — ASSESSMENT
[FreeTextEntry1] : 82-year-old man with past medical history of CAD s/p PCI(staged PCI of LAD on 10/1/2021)  diabetes, hyperlipidemia here for follow-up\par \par CAD s/p PCI 10/1/2021\par -The patient is asymptomatic with good exercise tolerance\par -Continue aspirin 81 mg daily\par -Continue clopidogrel 75 mg daily\par -Continue metoprolol succinate 25 mg daily \par -continue atorvastatin 40mg\par -Recommended to call the clinic immediately if new onset of chest pain\par \par HTN\par -Well-controlled today on second check\par -monitor home BP and call office if >140/90\par -Continue losartan 25 mg daily \par -Continue metoprolol succinate 25 mg daily \par -echo WNL as above\par -Cr 0.77  1/11/2022\par -low salt diet/exercise discussed\par \par HLD\par -LDL 61 1/11/2022\par -continue atorvastatin 40mg\par -Recommend healthy diet and exercise\par \par Follow-up in 6 months or sooner if any symptoms

## 2022-05-24 ENCOUNTER — APPOINTMENT (OUTPATIENT)
Dept: INTERNAL MEDICINE | Facility: CLINIC | Age: 82
End: 2022-05-24
Payer: MEDICARE

## 2022-05-24 VITALS
TEMPERATURE: 98.7 F | WEIGHT: 176 LBS | OXYGEN SATURATION: 96 % | SYSTOLIC BLOOD PRESSURE: 136 MMHG | DIASTOLIC BLOOD PRESSURE: 75 MMHG | HEART RATE: 75 BPM | BODY MASS INDEX: 29.32 KG/M2 | HEIGHT: 65 IN

## 2022-05-24 PROCEDURE — G0439: CPT

## 2022-05-24 PROCEDURE — 36415 COLL VENOUS BLD VENIPUNCTURE: CPT

## 2022-05-28 LAB
25(OH)D3 SERPL-MCNC: 40 NG/ML
ALBUMIN SERPL ELPH-MCNC: 4.4 G/DL
ALP BLD-CCNC: 102 U/L
ALT SERPL-CCNC: 21 U/L
ANION GAP SERPL CALC-SCNC: 13 MMOL/L
APPEARANCE: CLEAR
AST SERPL-CCNC: 20 U/L
BACTERIA: NEGATIVE
BILIRUB SERPL-MCNC: 0.4 MG/DL
BILIRUBIN URINE: NEGATIVE
BLOOD URINE: NEGATIVE
BUN SERPL-MCNC: 13 MG/DL
CALCIUM SERPL-MCNC: 9.1 MG/DL
CHLORIDE SERPL-SCNC: 99 MMOL/L
CHOLEST SERPL-MCNC: 144 MG/DL
CO2 SERPL-SCNC: 25 MMOL/L
COLOR: NORMAL
CREAT SERPL-MCNC: 0.73 MG/DL
EGFR: 92 ML/MIN/1.73M2
ESTIMATED AVERAGE GLUCOSE: 143 MG/DL
GLUCOSE QUALITATIVE U: NEGATIVE
GLUCOSE SERPL-MCNC: 106 MG/DL
HBA1C MFR BLD HPLC: 6.6 %
HDLC SERPL-MCNC: 50 MG/DL
HYALINE CASTS: 0 /LPF
KETONES URINE: NEGATIVE
LDLC SERPL CALC-MCNC: 80 MG/DL
LEUKOCYTE ESTERASE URINE: NEGATIVE
MICROSCOPIC-UA: NORMAL
NITRITE URINE: NEGATIVE
NONHDLC SERPL-MCNC: 95 MG/DL
PH URINE: 7
POTASSIUM SERPL-SCNC: 4.2 MMOL/L
PROT SERPL-MCNC: 6.5 G/DL
PROTEIN URINE: NEGATIVE
PSA SERPL-MCNC: 0.32 NG/ML
RED BLOOD CELLS URINE: 1 /HPF
SODIUM SERPL-SCNC: 137 MMOL/L
SPECIFIC GRAVITY URINE: 1.01
SQUAMOUS EPITHELIAL CELLS: 0 /HPF
T3 SERPL-MCNC: 119 NG/DL
T4 FREE SERPL-MCNC: 1 NG/DL
T4 SERPL-MCNC: 6.4 UG/DL
TRIGL SERPL-MCNC: 74 MG/DL
TSH SERPL-ACNC: 1.9 UIU/ML
UROBILINOGEN URINE: NORMAL
WHITE BLOOD CELLS URINE: 0 /HPF

## 2022-05-30 NOTE — END OF VISIT
[FreeTextEntry3] : I, Martina Morel, personally transcribed these services in the presence of Dr. Hema Snyder MD. I, Dr. Hema Snyder MD, personally performed the services described in this documentation as scribed by Martina Morel in my presence and it is both accurate and complete.

## 2022-05-30 NOTE — PHYSICAL EXAM
[No Acute Distress] : no acute distress [Well Nourished] : well nourished [Well Developed] : well developed [Well-Appearing] : well-appearing [Normal Sclera/Conjunctiva] : normal sclera/conjunctiva [PERRL] : pupils equal round and reactive to light [EOMI] : extraocular movements intact [Normal Outer Ear/Nose] : the outer ears and nose were normal in appearance [Normal Oropharynx] : the oropharynx was normal [No JVD] : no jugular venous distention [No Lymphadenopathy] : no lymphadenopathy [Supple] : supple [Thyroid Normal, No Nodules] : the thyroid was normal and there were no nodules present [No Respiratory Distress] : no respiratory distress  [No Accessory Muscle Use] : no accessory muscle use [Clear to Auscultation] : lungs were clear to auscultation bilaterally [Normal Rate] : normal rate  [Regular Rhythm] : with a regular rhythm [Normal S1, S2] : normal S1 and S2 [No Murmur] : no murmur heard [No Carotid Bruits] : no carotid bruits [No Abdominal Bruit] : a ~M bruit was not heard ~T in the abdomen [No Varicosities] : no varicosities [Pedal Pulses Present] : the pedal pulses are present [No Edema] : there was no peripheral edema [No Palpable Aorta] : no palpable aorta [No Extremity Clubbing/Cyanosis] : no extremity clubbing/cyanosis [Soft] : abdomen soft [Non Tender] : non-tender [Non-distended] : non-distended [No Masses] : no abdominal mass palpated [No HSM] : no HSM [Normal Bowel Sounds] : normal bowel sounds [Normal Posterior Cervical Nodes] : no posterior cervical lymphadenopathy [Normal Anterior Cervical Nodes] : no anterior cervical lymphadenopathy [No CVA Tenderness] : no CVA  tenderness [No Spinal Tenderness] : no spinal tenderness [No Joint Swelling] : no joint swelling [Grossly Normal Strength/Tone] : grossly normal strength/tone [No Rash] : no rash [Coordination Grossly Intact] : coordination grossly intact [No Focal Deficits] : no focal deficits [Normal Gait] : normal gait [Deep Tendon Reflexes (DTR)] : deep tendon reflexes were 2+ and symmetric [Normal Affect] : the affect was normal [Normal Insight/Judgement] : insight and judgment were intact [Normal Sphincter Tone] : normal sphincter tone [No Mass] : no mass [Stool Occult Blood] : stool negative for occult blood [FreeTextEntry1] : guaiac negative ; prostate mildly enlarged

## 2022-05-30 NOTE — PLAN
[FreeTextEntry1] : Blood tests and urine sent to the lab\par \par Continue same medications\par \par Colonoscopy \par \par \par \par

## 2022-05-30 NOTE — HEALTH RISK ASSESSMENT
[Good] : ~his/her~  mood as  good [Former] : Former [No] : In the past 12 months have you used drugs other than those required for medical reasons? No [No falls in past year] : Patient reported no falls in the past year [0] : 2) Feeling down, depressed, or hopeless: Not at all (0) [Audit-CScore] : 0 [de-identified] : lightly active [de-identified] : ADA diet [[id number]] : 0 [ColonoscopyDate] : Many years ago

## 2022-06-01 LAB
BASOPHILS # BLD AUTO: 0.02 K/UL
BASOPHILS NFR BLD AUTO: 0.3 %
EOSINOPHIL # BLD AUTO: 0.1 K/UL
EOSINOPHIL NFR BLD AUTO: 1.7 %
HCT VFR BLD CALC: 41.1 %
HGB BLD-MCNC: 13.6 G/DL
IMM GRANULOCYTES NFR BLD AUTO: 0.2 %
LYMPHOCYTES # BLD AUTO: 0.75 K/UL
LYMPHOCYTES NFR BLD AUTO: 12.6 %
MAN DIFF?: NORMAL
MCHC RBC-ENTMCNC: 30.4 PG
MCHC RBC-ENTMCNC: 33.1 GM/DL
MCV RBC AUTO: 91.7 FL
MONOCYTES # BLD AUTO: 0.52 K/UL
MONOCYTES NFR BLD AUTO: 8.8 %
NEUTROPHILS # BLD AUTO: 4.54 K/UL
NEUTROPHILS NFR BLD AUTO: 76.4 %
PLATELET # BLD AUTO: 208 K/UL
RBC # BLD: 4.48 M/UL
RBC # FLD: 12.8 %
WBC # FLD AUTO: 5.94 K/UL

## 2022-06-08 NOTE — DISCHARGE NOTE PROVIDER - NSDCQMPCI_CARD_ALL_CORE
No PAST MEDICAL HISTORY:  DM (diabetes mellitus)     HLD (hyperlipidemia)     HTN (hypertension)      Yes...

## 2022-07-03 ENCOUNTER — RX RENEWAL (OUTPATIENT)
Age: 82
End: 2022-07-03

## 2022-07-05 ENCOUNTER — RX RENEWAL (OUTPATIENT)
Age: 82
End: 2022-07-05

## 2022-09-09 NOTE — H&P ADULT - NSHPROSALLOTHERNEGRD_GEN_ALL_CORE
All other review of systems negative, except as noted in HPI Oriented - self; Oriented - place; Oriented - time

## 2022-12-12 ENCOUNTER — APPOINTMENT (OUTPATIENT)
Dept: INTERNAL MEDICINE | Facility: CLINIC | Age: 82
End: 2022-12-12

## 2022-12-12 VITALS
WEIGHT: 173 LBS | BODY MASS INDEX: 28.82 KG/M2 | RESPIRATION RATE: 16 BRPM | TEMPERATURE: 98 F | DIASTOLIC BLOOD PRESSURE: 70 MMHG | HEART RATE: 85 BPM | SYSTOLIC BLOOD PRESSURE: 128 MMHG | HEIGHT: 65 IN | OXYGEN SATURATION: 97 %

## 2022-12-12 DIAGNOSIS — J06.9 ACUTE UPPER RESPIRATORY INFECTION, UNSPECIFIED: ICD-10-CM

## 2022-12-12 DIAGNOSIS — K29.70 GASTRITIS, UNSPECIFIED, W/OUT BLEEDING: ICD-10-CM

## 2022-12-12 PROCEDURE — 99213 OFFICE O/P EST LOW 20 MIN: CPT | Mod: 25

## 2022-12-13 ENCOUNTER — RX RENEWAL (OUTPATIENT)
Age: 82
End: 2022-12-13

## 2022-12-13 ENCOUNTER — RESULT CHARGE (OUTPATIENT)
Age: 82
End: 2022-12-13

## 2022-12-13 LAB
FLUAV SPEC QL CULT: NEGATIVE
FLUBV AG SPEC QL IA: NEGATIVE

## 2022-12-13 RX ORDER — BLOOD-GLUCOSE METER
EACH MISCELLANEOUS
Qty: 200 | Refills: 1 | Status: ACTIVE | COMMUNITY
Start: 2022-01-10 | End: 1900-01-01

## 2022-12-13 NOTE — PATIENT PROFILE ADULT - DO YOU FEEL UNSAFE AT HOME, WORK, OR SCHOOL?
New PT order received and chart reviewed. Patient was evaluated and is currently on PT caseload. Will acknowledge new order. Re attempted at [phone] patient declines to participate in mobility 2/2 fatigue from dialysis. Thank you.    Tika Patel PT, DPT no

## 2022-12-14 ENCOUNTER — LABORATORY RESULT (OUTPATIENT)
Age: 82
End: 2022-12-14

## 2022-12-14 ENCOUNTER — APPOINTMENT (OUTPATIENT)
Dept: UROLOGY | Facility: CLINIC | Age: 82
End: 2022-12-14

## 2022-12-14 DIAGNOSIS — Z87.09 PERSONAL HISTORY OF OTHER DISEASES OF THE RESPIRATORY SYSTEM: ICD-10-CM

## 2022-12-14 PROCEDURE — 51798 US URINE CAPACITY MEASURE: CPT

## 2022-12-14 PROCEDURE — 99215 OFFICE O/P EST HI 40 MIN: CPT | Mod: 25

## 2022-12-14 NOTE — PHYSICAL EXAM
[General Appearance - Well Developed] : well developed [General Appearance - Well Nourished] : well nourished [Normal Appearance] : normal appearance [Well Groomed] : well groomed [General Appearance - In No Acute Distress] : no acute distress [Abdomen Soft] : soft [Abdomen Tenderness] : non-tender [Abdomen Mass (___ Cm)] : no abdominal mass palpated [Abdomen Hernia] : no hernia was discovered [Costovertebral Angle Tenderness] : no ~M costovertebral angle tenderness [Urethral Meatus] : meatus normal [Penis Abnormality] : normal circumcised penis [Urinary Bladder Findings] : the bladder was normal on palpation [Scrotum] : the scrotum was normal [Epididymis] : the epididymides were normal [Testes Tenderness] : no tenderness of the testes [Testes Mass (___cm)] : there were no testicular masses [Prostate Tenderness] : the prostate was not tender [No Prostate Nodules] : no prostate nodules [Skin Color & Pigmentation] : normal skin color and pigmentation [Edema] : no peripheral edema [] : no respiratory distress [Respiration, Rhythm And Depth] : normal respiratory rhythm and effort [Exaggerated Use Of Accessory Muscles For Inspiration] : no accessory muscle use [Oriented To Time, Place, And Person] : oriented to person, place, and time [Affect] : the affect was normal [Mood] : the mood was normal [Not Anxious] : not anxious [Normal Station and Gait] : the gait and station were normal for the patient's age [No Focal Deficits] : no focal deficits [No Palpable Adenopathy] : no palpable adenopathy [FreeTextEntry1] : Bilateral small hydroceles.

## 2022-12-14 NOTE — ASSESSMENT
[FreeTextEntry1] : I discussed the findings and options with Mr. AGUILAR PETERS in detail. The gross hematuria should be evaluated with a CT Abd/Pel (+/-) followed by an in-office cystoscopy. Both tests were described in detail. A urine culture  urine cytology and creatinine are pending. \par \par Mr. Peters will continue on the tamsulosin 0.4 mg daily for his voiding symptoms.\par \par A PSA was drawn today due to his history of prostate cancer and we will call him with the result.

## 2022-12-14 NOTE — ADDENDUM
[FreeTextEntry1] : A portion of this note was written by [Wei Bui] on 12/13/2022 acting as a scribe for Dr. Escobedo. \par \par I have personally reviewed the chart and agree that the record accurately reflects my personal performance of the history, physical exam, assessment, and plan.

## 2022-12-16 ENCOUNTER — NON-APPOINTMENT (OUTPATIENT)
Age: 82
End: 2022-12-16

## 2022-12-19 ENCOUNTER — RX RENEWAL (OUTPATIENT)
Age: 82
End: 2022-12-19

## 2022-12-19 NOTE — PHYSICAL EXAM
[No Acute Distress] : no acute distress [Well Nourished] : well nourished [Well Developed] : well developed [Well-Appearing] : well-appearing [Normal Sclera/Conjunctiva] : normal sclera/conjunctiva [PERRL] : pupils equal round and reactive to light [EOMI] : extraocular movements intact [Normal Outer Ear/Nose] : the outer ears and nose were normal in appearance [No JVD] : no jugular venous distention [No Lymphadenopathy] : no lymphadenopathy [Supple] : supple [Thyroid Normal, No Nodules] : the thyroid was normal and there were no nodules present [No Respiratory Distress] : no respiratory distress  [No Accessory Muscle Use] : no accessory muscle use [Clear to Auscultation] : lungs were clear to auscultation bilaterally [Normal Rate] : normal rate  [Regular Rhythm] : with a regular rhythm [Normal S1, S2] : normal S1 and S2 [No Murmur] : no murmur heard [No Carotid Bruits] : no carotid bruits [No Abdominal Bruit] : a ~M bruit was not heard ~T in the abdomen [No Varicosities] : no varicosities [Pedal Pulses Present] : the pedal pulses are present [No Edema] : there was no peripheral edema [No Palpable Aorta] : no palpable aorta [No Extremity Clubbing/Cyanosis] : no extremity clubbing/cyanosis [Soft] : abdomen soft [Non Tender] : non-tender [Non-distended] : non-distended [No Masses] : no abdominal mass palpated [No HSM] : no HSM [Normal Bowel Sounds] : normal bowel sounds [Normal Posterior Cervical Nodes] : no posterior cervical lymphadenopathy [Normal Anterior Cervical Nodes] : no anterior cervical lymphadenopathy [No CVA Tenderness] : no CVA  tenderness [No Spinal Tenderness] : no spinal tenderness [No Joint Swelling] : no joint swelling [Grossly Normal Strength/Tone] : grossly normal strength/tone [No Rash] : no rash [Coordination Grossly Intact] : coordination grossly intact [No Focal Deficits] : no focal deficits [Normal Gait] : normal gait [Deep Tendon Reflexes (DTR)] : deep tendon reflexes were 2+ and symmetric [Normal Affect] : the affect was normal [Normal Insight/Judgement] : insight and judgment were intact [de-identified] : moderately erythematous throat  [FreeTextEntry1] : deferred

## 2022-12-19 NOTE — HISTORY OF PRESENT ILLNESS
[Spouse] : spouse [FreeTextEntry8] : AGUILAR PETERS is a 82 year old male with a PMHx of CAD s/p PCI (10/1/22), HLD, DM, HTN, prostate CA who presents today c/o dry cough since yesterday. Pt's wife was tested positive for flu. \par

## 2022-12-19 NOTE — HEALTH RISK ASSESSMENT
[Never (0 pts)] : Never (0 points) [No] : In the past 12 months have you used drugs other than those required for medical reasons? No [No falls in past year] : Patient reported no falls in the past year [0] : 2) Feeling down, depressed, or hopeless: Not at all (0) [Audit-CScore] : 0 [de-identified] : lightly active [de-identified] : ADA diet [[id number]] : 0

## 2022-12-20 ENCOUNTER — NON-APPOINTMENT (OUTPATIENT)
Age: 82
End: 2022-12-20

## 2022-12-26 LAB
FLUAV H1 2009 PAND RNA SPEC QL NAA+PROBE: DETECTED
RAPID RVP RESULT: DETECTED
SARS-COV-2 RNA PNL RESP NAA+PROBE: NOT DETECTED

## 2023-01-05 ENCOUNTER — NON-APPOINTMENT (OUTPATIENT)
Age: 83
End: 2023-01-05

## 2023-01-05 ENCOUNTER — APPOINTMENT (OUTPATIENT)
Dept: HEART AND VASCULAR | Facility: CLINIC | Age: 83
End: 2023-01-05
Payer: MEDICARE

## 2023-01-05 VITALS
RESPIRATION RATE: 15 BRPM | TEMPERATURE: 97.6 F | HEIGHT: 65 IN | WEIGHT: 176 LBS | SYSTOLIC BLOOD PRESSURE: 125 MMHG | DIASTOLIC BLOOD PRESSURE: 74 MMHG | OXYGEN SATURATION: 97 % | HEART RATE: 68 BPM | BODY MASS INDEX: 29.32 KG/M2

## 2023-01-05 PROCEDURE — 99214 OFFICE O/P EST MOD 30 MIN: CPT | Mod: 25

## 2023-01-05 PROCEDURE — 93000 ELECTROCARDIOGRAM COMPLETE: CPT

## 2023-01-05 RX ORDER — OMEPRAZOLE 40 MG/1
40 CAPSULE, DELAYED RELEASE ORAL
Qty: 180 | Refills: 3 | Status: DISCONTINUED | COMMUNITY
Start: 2020-03-12 | End: 2023-01-05

## 2023-01-05 RX ORDER — OSELTAMIVIR PHOSPHATE 75 MG/1
75 CAPSULE ORAL TWICE DAILY
Qty: 10 | Refills: 0 | Status: DISCONTINUED | COMMUNITY
Start: 2022-12-14 | End: 2023-01-05

## 2023-01-05 RX ORDER — NITROFURANTOIN (MONOHYDRATE/MACROCRYSTALS) 25; 75 MG/1; MG/1
100 CAPSULE ORAL
Qty: 10 | Refills: 0 | Status: DISCONTINUED | COMMUNITY
Start: 2022-12-19 | End: 2023-01-05

## 2023-01-05 RX ORDER — PROMETHAZINE HYDROCHLORIDE 6.25 MG/5ML
6.25 SOLUTION ORAL
Qty: 140 | Refills: 0 | Status: DISCONTINUED | COMMUNITY
Start: 2022-12-12 | End: 2023-01-05

## 2023-01-05 RX ORDER — AZITHROMYCIN 250 MG/1
250 TABLET, FILM COATED ORAL
Qty: 1 | Refills: 0 | Status: DISCONTINUED | COMMUNITY
Start: 2022-12-12 | End: 2023-01-05

## 2023-01-05 NOTE — ASSESSMENT
[FreeTextEntry1] : AGUILAR PETERS is a 82 year M with past medical history significant for CAD s/p PCI(staged PCI of LAD on 10/1/2021) diabetes (on oral meds), hyperlipidemia. \par \par CAD s/p PCI 10/1/2021\par -The patient is asymptomatic with good exercise tolerance\par -stop aspirin 81 mg \par -Continue clopidogrel 75 mg daily\par -Continue metoprolol succinate 25 mg daily \par -continue atorvastatin 40mg\par -Recommended to call the clinic immediately if new onset of chest pain\par \par HTN\par -Well-controlled\par -monitor home BP and call office if >140/90\par -Continue losartan 25 mg daily \par -Continue metoprolol succinate 25 mg daily \par -echo WNL as above\par -Cr 0.77 1/11/2022\par -low salt diet/exercise discussed\par \par HLD\par -check lipids \par -continue atorvastatin 40mg\par -Recommend healthy diet and exercise\par \par Follow-up in 4 months or sooner if any symptoms.\par

## 2023-01-05 NOTE — PHYSICAL EXAM
[Well Developed] : well developed [Well Nourished] : well nourished [No Acute Distress] : no acute distress [Normal Venous Pressure] : normal venous pressure [No Carotid Bruit] : no carotid bruit [Normal S1, S2] : normal S1, S2 [No Rub] : no rub [No Gallop] : no gallop [Clear Lung Fields] : clear lung fields [Good Air Entry] : good air entry [No Respiratory Distress] : no respiratory distress  [Normal Gait] : normal gait [No Edema] : no edema [No Cyanosis] : no cyanosis [No Clubbing] : no clubbing [Moves all extremities] : moves all extremities [No Focal Deficits] : no focal deficits [Normal Speech] : normal speech [Alert and Oriented] : alert and oriented [Normal memory] : normal memory [de-identified] : systolic murmur 2-3/6 SB.

## 2023-01-05 NOTE — HISTORY OF PRESENT ILLNESS
[FreeTextEntry1] : AGUILAR PETERS is a 82 year y.o. M with medical history significant for CAD s/p PCI to dRCA (8/6/21) and staged PCI of LAD (10/1/2021), diabetes (on oral meds), hyperlipidemia.\par He is here to establish cardiac care with this provider. \par He is overall in his usual state of health \par Denies CP, SOB, palpitations, orthopnea, dizziness, syncope, LH, [place], edema \par Patient denies changes in his exercise tolerance during the past 6 months. He can walk 10 blocks and can climb  3 flights of stairs. \par Sleeps with 1 pillow\par \par Patient reports is is very active at work\par Complaint with meds\par \par ECG (1/5/23): NSR at 67 bpm w left axis, 1st degree AVB (Riley 240ms) and no STT abn \par EKG 5/23/2022 SR, First degree AV block Riley 220, occasional ectopic ventricular beats, left axis; \par EKG 1/11/2022: Sinus rhythm, first-degree AV block, PVCs, RILEY 228\par \par Pharmacological nuclear stress test 6/25/2021\par There is a medium sized reversible perfusion defect of moderate intensity involving the entire inferior and inferolateral myocardial wall suggestive of inducible ischemia in the posterior coronary circulation\par In addition there is a small reversible perfusion defect of mild intensity involving the mid apical anterior myocardial wall suggestive of inducible myocardial ischemia in the anterior LAD territory\par Normal left ventricular systolic function\par \par Echocardiogram 6/25/2021\par Normal left ventricular size and function\par Grade 1 diastolic dysfunction\par Trace MR\par Trace TR\par \par MEDS\par Aspirin Enteric Coated 81 mg oral delayed release tablet: 1 tab(s) orally once a day\par atorvastatin 40 mg oral tablet: 1 tab(s) orally once a day (at bedtime)\par losartan 25 mg oral tablet: 1 tab(s) orally once a day\par metFORMIN 500 mg oral tablet: 1 tab(s) orally 2 times a day\par metoprolol succinate 25 mg oral tablet, extended release: 1 tab(s) orally once a day\par pantoprazole 40 mg oral delayed release tablet: 1 tab(s) orally once a day (before a meal)\par Plavix 75 mg oral tablet: 1 tab(s) orally once a day\par tamsulosin 0.4 mg oral capsule: 1 cap(s) orally once a day\par Vitamin B6 50 mg oral tablet: 1 tab(s) orally once a day\par Vitamin C 500 mg oral tablet: 1 tab(s) orally once a day\par Vitamin D3 50 mcg (2000 intl units) oral capsule: 1 cap(s) orally once a day\par \par \par ECG (5/23/22): SR w left axis, 1st degree AVB (OR=439 ms) and no STT abn\par EKG 6/15/2021\par Sinus rhythm, left anterior fascicular block\par \par CARDIAC CATH 8/6/2021\par PROCEDURE: CORONARY ANGIOGRAM, LHC, LVGRAM, ROTATIONAL ATHERECTOMY, PCI\par INDICATION: UNSTABLE ANGINA/POSITIVE STRESS TEST\par ATTENDING: DR. MARTIN RYAN MD \par ACCESS: RRA/6F RFA (s/p perclose), 6F RFV ( sutured) \par \par FINDINGS \par mRCA= 90% severely calcific \par dRCA=80% \par LM= 20% distal\par pLAD= 70% severely calcific\par mLAD= patent stent, 80% distal to stent\par pLCx= moderate \par dLCx= severe diffuse\par \par LVEF:55 %\par LVEDP: 8 mmHg\par \par PROCEDURE: \par Successful rotational atherectomy with 1.5 marie and PCI of mid and distal RCA with LIZ x 2 (Synergy 2.75 x 24 and Synergy 3.5 x 38) with excellent angiographic result \par \par CARDIAC CATH 10/1/2021\par Successful rotational atherectomy with 1.5 bur and PCI of 90% proximal and mid LAD with a 3.0x 26 and a 2.75 x 26 LIZ with excellent angiographic result\par \par \par LABS (5/24/22): ; TG 74; LDL 80; NonHDL 95; HDL 50; Cre 0.73; K 4.2; LFTs wnl; eGFR 92; A1c 6.6; TFTs wnl; \par \par \par \par

## 2023-01-11 ENCOUNTER — APPOINTMENT (OUTPATIENT)
Dept: UROLOGY | Facility: CLINIC | Age: 83
End: 2023-01-11
Payer: MEDICARE

## 2023-01-11 DIAGNOSIS — Z87.09 PERSONAL HISTORY OF OTHER DISEASES OF THE RESPIRATORY SYSTEM: ICD-10-CM

## 2023-01-11 PROCEDURE — 52281 CYSTOSCOPY AND TREATMENT: CPT

## 2023-01-11 PROCEDURE — 99214 OFFICE O/P EST MOD 30 MIN: CPT | Mod: 25

## 2023-01-11 NOTE — ADDENDUM
[FreeTextEntry1] : A portion of this note was written by [Wei Bui] on 01/10/2023 acting as a scribe for Dr. Escobedo. \par \par I have personally reviewed the chart and agree that the record accurately reflects my personal performance of the history, physical exam, assessment, and plan.

## 2023-01-11 NOTE — HISTORY OF PRESENT ILLNESS
[FreeTextEntry1] : Mr. AGUILAR PETERS comes in today for a urologic follow-up, including a scheduled cystoscopy to evaluate a recent  history of gross painless hematuria with passage of clots (which has since resolved). He denies any fever or trauma to the area. \par \par Based on a PSA elevation to 4.3 ng/ml, Mr. Peters underwent a prostate biopsy in December 2018 which identified a Grade Group 4 prostatic adenocarcinoma (see below). He chose EBRT and completed the course in March 2019 with Dr. Jeanne Dillard.  His PSA nadired at 0.38ng/ml in June 2021 (see below). \par \par From his general urologic history, Mr. PETERS reports mild stable lower urinary tract symptoms, with nocturia x 1-2. He is continuing on tamsulosin 0.4mg.  \par IPSS: 5/35\par \par PSAs: 12/14/22--0.52; 5/24/22--0.32; 12/1/21--0.32; 6/9/21--0.38; 9/18/20--0.67; 5/14/20--2.73; 4/11/19--2.7; 10/15/18--4.3; 9/27/18--5.3; 7/16/18--4.6; 6/1/16--2.67; 2/9/16--3.33; 9/17/15--2.72; \par \par Pathology (Prostate Biopsy): 12/19/18--Grade Group 4 prostatic adenocarcinoma ([place] 4+4) in the right base, with 1/3 cores and <5% of the tissue involved. [place] 3+3 in the right midgland; \par \par MRI: 11/7/18--1.1cm lesion in the right peripheral base characterized as a PI-RADS 5 lesion. 73cc gland; \par \par CT: 12/16/22--No evidence of urinary tract calculus, obstruction, or discrete urothelial lesion.  Mild wall thickening of the urinary bladder with trabecular contour of the mucosa numerous tiny diverticula, suspicious for chronic outlet obstruction.  Benign left adrenal adenoma; 1/4/19--Enlarged, lobulated prostate which indents the base of the bladder with heterogeneous enhancement consistent with prostate cancer. No osseous metastases; 1/6/16--Tiny nonobstructing stone in each kidney. Stable left adrenal adenoma. Moderate enlargement and intravesical protrusion of the prostate. \par \par Bone Scan: 1/4/19--No definite evidence of bony metastatic disease; \par \par Scrotal US: 1/24/17--Bilateral small hydroceles (L > R). Bilateral spermatoceles;

## 2023-01-11 NOTE — PHYSICAL EXAM
[General Appearance - Well Developed] : well developed [General Appearance - Well Nourished] : well nourished [Normal Appearance] : normal appearance [Well Groomed] : well groomed [General Appearance - In No Acute Distress] : no acute distress [Abdomen Soft] : soft [Abdomen Tenderness] : non-tender [Abdomen Mass (___ Cm)] : no abdominal mass palpated [Abdomen Hernia] : no hernia was discovered [Costovertebral Angle Tenderness] : no ~M costovertebral angle tenderness [Urethral Meatus] : meatus normal [Penis Abnormality] : normal circumcised penis [Urinary Bladder Findings] : the bladder was normal on palpation [Scrotum] : the scrotum was normal [Epididymis] : the epididymides were normal [Testes Tenderness] : no tenderness of the testes [Testes Mass (___cm)] : there were no testicular masses [Prostate Tenderness] : the prostate was not tender [No Prostate Nodules] : no prostate nodules [Skin Color & Pigmentation] : normal skin color and pigmentation [Edema] : no peripheral edema [] : no respiratory distress [Respiration, Rhythm And Depth] : normal respiratory rhythm and effort [Exaggerated Use Of Accessory Muscles For Inspiration] : no accessory muscle use [Affect] : the affect was normal [Oriented To Time, Place, And Person] : oriented to person, place, and time [Mood] : the mood was normal [Not Anxious] : not anxious [Normal Station and Gait] : the gait and station were normal for the patient's age [No Focal Deficits] : no focal deficits [No Palpable Adenopathy] : no palpable adenopathy [FreeTextEntry1] : Bilateral small hydroceles.

## 2023-01-11 NOTE — ASSESSMENT
[FreeTextEntry1] : I discussed the findings and options with Mr. AGUILAR PETERS in detail and reviewed the CT findings.  I also provided him with a copy of the report for his records.\par \par Fortunately, today cystoscopy was negative for any significant lower urinary tract pathology although he has an inconsequential short bulbar stricture.\par \par Mr. Peters will continue on the tamsulosin 0.4 mg daily for his voiding symptoms.\par \par Providing there are no new problems, I look forward to seeing him in 6 months (bladder sono, PSA).

## 2023-01-11 NOTE — LETTER BODY
[Dear  ___] : Dear  [unfilled], [Please see my note below.] : Please see my note below. [Consult Letter:] : I had the pleasure of evaluating your patient, [unfilled]. [Consult Closing:] : Thank you very much for allowing me to participate in the care of this patient.  If you have any questions, please do not hesitate to contact me. [Sincerely,] : Sincerely, [FreeTextEntry3] : Oscar Escobedo MD, FACS

## 2023-01-16 ENCOUNTER — RX RENEWAL (OUTPATIENT)
Age: 83
End: 2023-01-16

## 2023-02-21 ENCOUNTER — APPOINTMENT (OUTPATIENT)
Dept: INTERNAL MEDICINE | Facility: CLINIC | Age: 83
End: 2023-02-21
Payer: MEDICARE

## 2023-02-21 VITALS
OXYGEN SATURATION: 97 % | RESPIRATION RATE: 16 BRPM | BODY MASS INDEX: 28.99 KG/M2 | DIASTOLIC BLOOD PRESSURE: 80 MMHG | WEIGHT: 174 LBS | HEART RATE: 66 BPM | HEIGHT: 65 IN | SYSTOLIC BLOOD PRESSURE: 131 MMHG | TEMPERATURE: 98 F

## 2023-02-21 PROCEDURE — 36415 COLL VENOUS BLD VENIPUNCTURE: CPT

## 2023-02-21 PROCEDURE — 99213 OFFICE O/P EST LOW 20 MIN: CPT | Mod: 25

## 2023-02-26 NOTE — HEALTH RISK ASSESSMENT
[Never (0 pts)] : Never (0 points) [No] : In the past 12 months have you used drugs other than those required for medical reasons? No [No falls in past year] : Patient reported no falls in the past year [0] : 2) Feeling down, depressed, or hopeless: Not at all (0) [Former] : Former [Audit-CScore] : 0 [de-identified] : lightly active [de-identified] : ADA diet [[id number]] : 0

## 2023-02-26 NOTE — END OF VISIT
[FreeTextEntry3] :  I, Margaret Mack, personally transcribed these services in the presence of Dr. Hema Snyder MD.\par I, Dr. Hema Snyder MD, personally performed the services described in this documentation as scribed by Margaret Mack in my presence and it is both accurate and complete.

## 2023-02-26 NOTE — HISTORY OF PRESENT ILLNESS
[FreeTextEntry1] : follow-up  [de-identified] : AGUILAR PETERS is a 82 year old male with a PMHx of CAD, chronic GERD, DM, HLD, HTN, hematuria, urolithiasis, LUTS and prostate CA who presents today for follow-up. He is feeling okay and offers no specific complaints. He brought in today his  long of fasting blood sugar from 9/16/22 to now. His glucose levels usually from 110 (Lower) to 136 (Higher).\par \par Pt was seen by  and had a CT of Abdomen and Pelvis done. Pt was told to return for follow-up with his urologist in 1year.\par \par Pt was also seen by his cardiologist about 4 weeks ago and referred for blood test.

## 2023-03-06 ENCOUNTER — RX RENEWAL (OUTPATIENT)
Age: 83
End: 2023-03-06

## 2023-03-07 LAB
ALBUMIN SERPL ELPH-MCNC: 4.8 G/DL
ALP BLD-CCNC: 106 U/L
ALT SERPL-CCNC: 22 U/L
ANION GAP SERPL CALC-SCNC: 14 MMOL/L
APPEARANCE: CLEAR
AST SERPL-CCNC: 24 U/L
BACTERIA: NEGATIVE
BASOPHILS # BLD AUTO: 0.04 K/UL
BASOPHILS NFR BLD AUTO: 0.4 %
BILIRUB SERPL-MCNC: 0.4 MG/DL
BILIRUBIN URINE: NEGATIVE
BLOOD URINE: NEGATIVE
BUN SERPL-MCNC: 15 MG/DL
CALCIUM SERPL-MCNC: 9.4 MG/DL
CHLORIDE SERPL-SCNC: 97 MMOL/L
CHOLEST SERPL-MCNC: 154 MG/DL
CO2 SERPL-SCNC: 23 MMOL/L
COLOR: NORMAL
CREAT SERPL-MCNC: 0.72 MG/DL
EGFR: 92 ML/MIN/1.73M2
EOSINOPHIL # BLD AUTO: 0.1 K/UL
EOSINOPHIL NFR BLD AUTO: 1.1 %
ESTIMATED AVERAGE GLUCOSE: 140 MG/DL
GLUCOSE QUALITATIVE U: NEGATIVE
GLUCOSE SERPL-MCNC: 125 MG/DL
HBA1C MFR BLD HPLC: 6.5 %
HCT VFR BLD CALC: 45.7 %
HDLC SERPL-MCNC: 57 MG/DL
HGB BLD-MCNC: 14.9 G/DL
HYALINE CASTS: 0 /LPF
IMM GRANULOCYTES NFR BLD AUTO: 0.2 %
KETONES URINE: NEGATIVE
LDLC SERPL CALC-MCNC: 84 MG/DL
LEUKOCYTE ESTERASE URINE: NEGATIVE
LYMPHOCYTES # BLD AUTO: 0.96 K/UL
LYMPHOCYTES NFR BLD AUTO: 10.2 %
MAN DIFF?: NORMAL
MCHC RBC-ENTMCNC: 30.8 PG
MCHC RBC-ENTMCNC: 32.6 GM/DL
MCV RBC AUTO: 94.4 FL
MICROSCOPIC-UA: NORMAL
MONOCYTES # BLD AUTO: 0.61 K/UL
MONOCYTES NFR BLD AUTO: 6.5 %
NEUTROPHILS # BLD AUTO: 7.65 K/UL
NEUTROPHILS NFR BLD AUTO: 81.6 %
NITRITE URINE: NEGATIVE
NONHDLC SERPL-MCNC: 97 MG/DL
PH URINE: 7
PLATELET # BLD AUTO: 220 K/UL
POTASSIUM SERPL-SCNC: 4.6 MMOL/L
PROT SERPL-MCNC: 6.9 G/DL
PROTEIN URINE: NEGATIVE
RBC # BLD: 4.84 M/UL
RBC # FLD: 13.2 %
RED BLOOD CELLS URINE: 2 /HPF
SODIUM SERPL-SCNC: 134 MMOL/L
SPECIFIC GRAVITY URINE: 1.01
SQUAMOUS EPITHELIAL CELLS: 0 /HPF
T3 SERPL-MCNC: 112 NG/DL
T4 FREE SERPL-MCNC: 1.1 NG/DL
T4 SERPL-MCNC: 6.9 UG/DL
TRIGL SERPL-MCNC: 64 MG/DL
TSH SERPL-ACNC: 2.09 UIU/ML
UROBILINOGEN URINE: NORMAL
WBC # FLD AUTO: 9.38 K/UL
WHITE BLOOD CELLS URINE: 0 /HPF

## 2023-05-08 ENCOUNTER — APPOINTMENT (OUTPATIENT)
Dept: HEART AND VASCULAR | Facility: CLINIC | Age: 83
End: 2023-05-08
Payer: MEDICARE

## 2023-05-08 VITALS
DIASTOLIC BLOOD PRESSURE: 67 MMHG | SYSTOLIC BLOOD PRESSURE: 139 MMHG | HEART RATE: 65 BPM | OXYGEN SATURATION: 97 % | WEIGHT: 174 LBS | HEIGHT: 65 IN | RESPIRATION RATE: 16 BRPM | TEMPERATURE: 98.4 F | BODY MASS INDEX: 28.99 KG/M2

## 2023-05-08 PROCEDURE — 99214 OFFICE O/P EST MOD 30 MIN: CPT

## 2023-05-08 RX ORDER — ASPIRIN ENTERIC COATED TABLETS 81 MG 81 MG/1
81 TABLET, DELAYED RELEASE ORAL
Qty: 90 | Refills: 0 | Status: DISCONTINUED | COMMUNITY
Start: 2022-01-11 | End: 2023-05-08

## 2023-05-08 NOTE — ASSESSMENT
[FreeTextEntry1] : AGUILAR PETERS is a 82 year M with past medical history significant for CAD s/p PCI(staged PCI of LAD on 10/1/2021) diabetes (on oral meds), hyperlipidemia. He is overall doing well and has no cardiac complaints at this time. He reports having good exercise capacity. BP is well controlled. On exam , he has a systolic murmur \par -Continue clopidogrel 75 mg daily\par -Continue metoprolol 25 mg daily \par -continue atorvastatin 40mg daily \par -Continue losartan 25 mg daily \par -low salt diet/exercise discussed\par -Increase ambulation as tolerated to aim for approximately 30 minutes of moderate activity 5 days a week.  Heart healthy diet low in sodium, sugars and saturated fats and high in fruits, vegetables and whole grains.  Weight loss.\par \par \par Patient advised to go to the nearest ED whenever any symptoms persist and/or worsens.  Patient/Family/Caregiver verbalized complete understanding of these instructions.\par \par I spent a total of 25 minutes with more than 50% spent on counseling and coordinating care.\par \par Follow-up in 4 months or sooner if any symptoms.\par

## 2023-05-08 NOTE — HISTORY OF PRESENT ILLNESS
[FreeTextEntry1] : AGUILAR PETERS is a 82 year y.o. M with medical history significant for CAD s/p PCI to dRCA (8/6/21) and staged PCI of LAD (10/1/2021), diabetes (on oral meds), hyperlipidemia.\par He is here for follow-up. \par He is overall in his usual state of health \par Denies CP, SOB, palpitations, orthopnea, dizziness, syncope, LH, [place], edema \par Patient denies changes in his exercise tolerance during the past 6 months. He can walk 10 blocks and can climb  3 flights of stairs. \par Sleeps with 1 pillow\par \par Patient reports is very active at work\par Compliant with meds\par \par DATA\par ECG (1/5/23): NSR at 67 bpm w left axis, 1st degree AVB (Riley 240ms) and no STT abn \par EKG 5/23/2022 SR, First degree AV block Riley 220, occasional ectopic ventricular beats, left axis; \par EKG 1/11/2022: Sinus rhythm, first-degree AV block, PVCs, RILEY 228\par \par Pharmacological nuclear stress test 6/25/2021\par There is a medium sized reversible perfusion defect of moderate intensity involving the entire inferior and inferolateral myocardial wall suggestive of inducible ischemia in the posterior coronary circulation\par In addition there is a small reversible perfusion defect of mild intensity involving the mid apical anterior myocardial wall suggestive of inducible myocardial ischemia in the anterior LAD territory\par Normal left ventricular systolic function\par \par Echocardiogram 6/25/2021\par Normal left ventricular size and function\par Grade 1 diastolic dysfunction\par Trace MR\par Trace TR\par \par MEDS\par Aspirin Enteric Coated 81 mg oral delayed release tablet: 1 tab(s) orally once a day\par atorvastatin 40 mg oral tablet: 1 tab(s) orally once a day (at bedtime)\par losartan 25 mg oral tablet: 1 tab(s) orally once a day\par metFORMIN 500 mg oral tablet: 1 tab(s) orally 2 times a day\par metoprolol succinate 25 mg oral tablet, extended release: 1 tab(s) orally once a day\par pantoprazole 40 mg oral delayed release tablet: 1 tab(s) orally once a day (before a meal)\par Plavix 75 mg oral tablet: 1 tab(s) orally once a day\par tamsulosin 0.4 mg oral capsule: 1 cap(s) orally once a day\par Vitamin B6 50 mg oral tablet: 1 tab(s) orally once a day\par Vitamin C 500 mg oral tablet: 1 tab(s) orally once a day\par Vitamin D3 50 mcg (2000 intl units) oral capsule: 1 cap(s) orally once a day\par \par \par ECG (5/23/22): SR w left axis, 1st degree AVB (VX=027 ms) and no STT abn\par EKG 6/15/2021\par Sinus rhythm, left anterior fascicular block\par \par CARDIAC CATH 8/6/2021\par PROCEDURE: CORONARY ANGIOGRAM, LHC, LVGRAM, ROTATIONAL ATHERECTOMY, PCI\par INDICATION: UNSTABLE ANGINA/POSITIVE STRESS TEST\par ATTENDING: DR. MARTIN RYAN MD \par ACCESS: RRA/6F RFA (s/p perclose), 6F RFV ( sutured) \par \par FINDINGS \par mRCA= 90% severely calcific \par dRCA=80% \par LM= 20% distal\par pLAD= 70% severely calcific\par mLAD= patent stent, 80% distal to stent\par pLCx= moderate \par dLCx= severe diffuse\par \par LVEF:55 %\par LVEDP: 8 mmHg\par \par PROCEDURE: \par Successful rotational atherectomy with 1.5 marie and PCI of mid and distal RCA with LIZ x 2 (Synergy 2.75 x 24 and Synergy 3.5 x 38) with excellent angiographic result \par \par CARDIAC CATH 10/1/2021\par Successful rotational atherectomy with 1.5 bur and PCI of 90% proximal and mid LAD with a 3.0x 26 and a 2.75 x 26 LIZ with excellent angiographic result\par \par (02/21/23)  A1C 6.5, TG 64, , HDL 57, LDL 84, NON-HDL 97,  K 4.6, CRE 0.72, LFTs WNL, eGFR 92, HCT 45.7, TSH 2.09\par LABS (5/24/22): ; TG 74; LDL 80; NonHDL 95; HDL 50; Cre 0.73; K 4.2; LFTs wnl; eGFR 92; A1c 6.6; TFTs wnl; \par \par \par \par

## 2023-05-08 NOTE — PHYSICAL EXAM
[Well Developed] : well developed [Well Nourished] : well nourished [No Acute Distress] : no acute distress [Normal Venous Pressure] : normal venous pressure [No Carotid Bruit] : no carotid bruit [Normal S1, S2] : normal S1, S2 [No Gallop] : no gallop [Clear Lung Fields] : clear lung fields [Good Air Entry] : good air entry [No Respiratory Distress] : no respiratory distress  [Normal Gait] : normal gait [No Edema] : no edema [No Cyanosis] : no cyanosis [No Clubbing] : no clubbing [Moves all extremities] : moves all extremities [No Focal Deficits] : no focal deficits [Normal Speech] : normal speech [Alert and Oriented] : alert and oriented [Normal memory] : normal memory [No Rub] : no rub [Murmur] : murmur [de-identified] : systolic murmur in sternal border 2-3/6 [de-identified] : systolic murmur 2-3/6 SB.

## 2023-05-11 ENCOUNTER — APPOINTMENT (OUTPATIENT)
Dept: HEART AND VASCULAR | Facility: CLINIC | Age: 83
End: 2023-05-11
Payer: MEDICARE

## 2023-05-11 PROCEDURE — 93306 TTE W/DOPPLER COMPLETE: CPT

## 2023-05-23 ENCOUNTER — RX RENEWAL (OUTPATIENT)
Age: 83
End: 2023-05-23

## 2023-05-30 ENCOUNTER — APPOINTMENT (OUTPATIENT)
Dept: INTERNAL MEDICINE | Facility: CLINIC | Age: 83
End: 2023-05-30
Payer: MEDICARE

## 2023-05-30 VITALS
WEIGHT: 175 LBS | SYSTOLIC BLOOD PRESSURE: 132 MMHG | DIASTOLIC BLOOD PRESSURE: 74 MMHG | BODY MASS INDEX: 29.16 KG/M2 | HEIGHT: 65 IN | HEART RATE: 61 BPM | RESPIRATION RATE: 14 BRPM | TEMPERATURE: 98.2 F | OXYGEN SATURATION: 98 %

## 2023-05-30 DIAGNOSIS — Z00.00 ENCOUNTER FOR GENERAL ADULT MEDICAL EXAMINATION W/OUT ABNORMAL FINDINGS: ICD-10-CM

## 2023-05-30 PROCEDURE — G0439: CPT

## 2023-05-30 PROCEDURE — 36415 COLL VENOUS BLD VENIPUNCTURE: CPT

## 2023-05-30 NOTE — HISTORY OF PRESENT ILLNESS
[de-identified] : AGUILAR PETERS is a 83 year old male with a PMHx of CAD s/p PCI , GERD, DM, HLD, HTN, prostate CA who presents today for annual physical examination. Seen by cardiology and had EKG & Stress test done. He is feeling okay and offers no specific complaints.\par \par \par Glucose at home reads usually 110 -130 rarely 140  [FreeTextEntry1] : Physical examination

## 2023-05-30 NOTE — HEALTH RISK ASSESSMENT
[Good] : ~his/her~  mood as  good [Never (0 pts)] : Never (0 points) [No] : In the past 12 months have you used drugs other than those required for medical reasons? No [No falls in past year] : Patient reported no falls in the past year [0] : 2) Feeling down, depressed, or hopeless: Not at all (0) [Former] : Former [Audit-CScore] : 0 [de-identified] : lightly active [de-identified] : ADA diet [[id number]] : 0 [ColonoscopyDate] : 2022

## 2023-05-30 NOTE — PLAN
[FreeTextEntry1] : Blood tests and urine sent to the lab\par \par \par Continue same medications\par \par ADA diet

## 2023-06-04 LAB
25(OH)D3 SERPL-MCNC: 40.6 NG/ML
ALBUMIN SERPL ELPH-MCNC: 4.7 G/DL
ALP BLD-CCNC: 101 U/L
ALT SERPL-CCNC: 19 U/L
ANION GAP SERPL CALC-SCNC: 13 MMOL/L
AST SERPL-CCNC: 20 U/L
BILIRUB SERPL-MCNC: 0.5 MG/DL
BUN SERPL-MCNC: 14 MG/DL
CALCIUM SERPL-MCNC: 9.5 MG/DL
CHLORIDE SERPL-SCNC: 99 MMOL/L
CHOLEST SERPL-MCNC: 138 MG/DL
CO2 SERPL-SCNC: 24 MMOL/L
CREAT SERPL-MCNC: 0.79 MG/DL
EGFR: 89 ML/MIN/1.73M2
ESTIMATED AVERAGE GLUCOSE: 137 MG/DL
GLUCOSE SERPL-MCNC: 109 MG/DL
HBA1C MFR BLD HPLC: 6.4 %
HDLC SERPL-MCNC: 50 MG/DL
LDLC SERPL CALC-MCNC: 72 MG/DL
NONHDLC SERPL-MCNC: 88 MG/DL
POTASSIUM SERPL-SCNC: 4.9 MMOL/L
PROT SERPL-MCNC: 6.5 G/DL
PSA SERPL-MCNC: 0.53 NG/ML
SODIUM SERPL-SCNC: 136 MMOL/L
T3 SERPL-MCNC: 112 NG/DL
T4 FREE SERPL-MCNC: 1 NG/DL
T4 SERPL-MCNC: 6 UG/DL
TRIGL SERPL-MCNC: 79 MG/DL
TSH SERPL-ACNC: 2.33 UIU/ML

## 2023-06-12 LAB
APPEARANCE: CLEAR
BACTERIA: NEGATIVE /HPF
BILIRUBIN URINE: NEGATIVE
BLOOD URINE: NEGATIVE
CAST: 0 /LPF
COLOR: YELLOW
EPITHELIAL CELLS: 0 /HPF
GLUCOSE QUALITATIVE U: NEGATIVE MG/DL
KETONES URINE: NEGATIVE MG/DL
LEUKOCYTE ESTERASE URINE: NEGATIVE
MICROSCOPIC-UA: NORMAL
NITRITE URINE: NEGATIVE
PH URINE: 6.5
PROTEIN URINE: NEGATIVE MG/DL
RED BLOOD CELLS URINE: 1 /HPF
SPECIFIC GRAVITY URINE: 1.02
UROBILINOGEN URINE: 0.2 MG/DL
WHITE BLOOD CELLS URINE: 0 /HPF

## 2023-06-28 ENCOUNTER — RX RENEWAL (OUTPATIENT)
Age: 83
End: 2023-06-28

## 2023-08-07 NOTE — REASON FOR VISIT
Verbal/written post procedure instructions were given to patient/caregiver.
[Annual Wellness Visit] : an annual wellness visit

## 2023-08-10 ENCOUNTER — RX RENEWAL (OUTPATIENT)
Age: 83
End: 2023-08-10

## 2023-08-15 ENCOUNTER — RX RENEWAL (OUTPATIENT)
Age: 83
End: 2023-08-15

## 2023-08-15 RX ORDER — PANTOPRAZOLE 40 MG/1
40 TABLET, DELAYED RELEASE ORAL
Qty: 90 | Refills: 3 | Status: ACTIVE | COMMUNITY
Start: 2022-12-12 | End: 1900-01-01

## 2023-09-06 ENCOUNTER — APPOINTMENT (OUTPATIENT)
Dept: UROLOGY | Facility: CLINIC | Age: 83
End: 2023-09-06

## 2023-09-07 ENCOUNTER — APPOINTMENT (OUTPATIENT)
Dept: HEART AND VASCULAR | Facility: CLINIC | Age: 83
End: 2023-09-07
Payer: MEDICARE

## 2023-09-07 VITALS
HEIGHT: 66 IN | BODY MASS INDEX: 28.28 KG/M2 | SYSTOLIC BLOOD PRESSURE: 157 MMHG | DIASTOLIC BLOOD PRESSURE: 67 MMHG | HEART RATE: 69 BPM | OXYGEN SATURATION: 97 % | RESPIRATION RATE: 14 BRPM | TEMPERATURE: 98.5 F | WEIGHT: 176 LBS

## 2023-09-07 PROCEDURE — 93000 ELECTROCARDIOGRAM COMPLETE: CPT

## 2023-09-07 PROCEDURE — 99214 OFFICE O/P EST MOD 30 MIN: CPT | Mod: 25

## 2023-09-07 NOTE — HISTORY OF PRESENT ILLNESS
[FreeTextEntry1] : AGUILAR PETERS is a 83 year y.o. M with medical history significant for CAD s/p PCI to dRCA (8/6/21) and staged PCI of LAD (10/1/2021), diabetes (on oral meds), hyperlipidemia. He is here for follow-up.  BP is mildly elevated, however, he states BP has been well controlled at home.  The last time I saw him was May 2023.  He is overall in his usual state of health.   Denies CP, SOB, palpitations, orthopnea, dizziness, syncope, LH, edema.  Patient denies changes in his exercise tolerance during the past 6 months. He can walk 10 blocks and can climb 3 flights of stairs.  Sleeps with 1 pillow  Patient reports is very active at work. Compliant with meds  DATA ECG (9/7/23): NSR at 61 bpm w left axis, 1st degree AVB ([id number] ms), PRWP and no STT abn ECG (1/5/23): NSR at 67 bpm w left axis, 1st degree AVB (Riley 240ms) and no STT abn  EKG 5/23/2022 SR, First degree AV block Riley 220, occasional ectopic ventricular beats, left axis;  EKG 1/11/2022: Sinus rhythm, first-degree AV block, PVCs, RILEY 228  Pharmacological nuclear stress test 6/25/2021 There is a medium sized reversible perfusion defect of moderate intensity involving the entire inferior and inferolateral myocardial wall suggestive of inducible ischemia in the posterior coronary circulation In addition there is a small reversible perfusion defect of mild intensity involving the mid apical anterior myocardial wall suggestive of inducible myocardial ischemia in the anterior LAD territory Normal left ventricular systolic function  ECHO (5/11/23): Nl LV size and function. EF 66%. Mild LVH. Mildly dilated LA. Grade IDD, Mild AR/AS. Trace MR.   Echocardiogram 6/25/2021 Normal left ventricular size and function Grade 1 diastolic dysfunction Trace MR Trace TR  MEDS Aspirin Enteric Coated 81 mg oral delayed release tablet: 1 tab(s) orally once a day atorvastatin 40 mg oral tablet: 1 tab(s) orally once a day (at bedtime) losartan 25 mg oral tablet: 1 tab(s) orally once a day metFORMIN 500 mg oral tablet: 1 tab(s) orally 2 times a day metoprolol succinate 25 mg oral tablet, extended release: 1 tab(s) orally once a day pantoprazole 40 mg oral delayed release tablet: 1 tab(s) orally once a day (before a meal) Plavix 75 mg oral tablet: 1 tab(s) orally once a day tamsulosin 0.4 mg oral capsule: 1 cap(s) orally once a day Vitamin B6 50 mg oral tablet: 1 tab(s) orally once a day Vitamin C 500 mg oral tablet: 1 tab(s) orally once a day Vitamin D3 50 mcg (2000 intl units) oral capsule: 1 cap(s) orally once a day   ECG (5/23/22): SR w left axis, 1st degree AVB (PW=647 ms) and no STT abn EKG 6/15/2021 Sinus rhythm, left anterior fascicular block  CARDIAC CATH 8/6/2021 PROCEDURE: CORONARY ANGIOGRAM, LHC, LVGRAM, ROTATIONAL ATHERECTOMY, PCI INDICATION: UNSTABLE ANGINA/POSITIVE STRESS TEST ATTENDING: DR. MARTIN RYAN MD  ACCESS: RRA/6F RFA (s/p perclose), 6F RFV ( sutured)   FINDINGS  mRCA= 90% severely calcific  dRCA=80%  LM= 20% distal pLAD= 70% severely calcific mLAD= patent stent, 80% distal to stent pLCx= moderate  dLCx= severe diffuse  LVEF:55 % LVEDP: 8 mmHg  PROCEDURE:  Successful rotational atherectomy with 1.5 marie and PCI of mid and distal RCA with LIZ x 2 (Synergy 2.75 x 24 and Synergy 3.5 x 38) with excellent angiographic result   CARDIAC CATH 10/1/2021 Successful rotational atherectomy with 1.5 bur and PCI of 90% proximal and mid LAD with a 3.0x 26 and a 2.75 x 26 LIZ with excellent angiographic result  LABS  (5/30/23): Cre 0.79; K 4.9; LFTs wnl; eGFR 89; TG 79; ; HDL 50; LDL 72; A1c 6.4; TSH 2.33; Hgb 14; Hct 40.6 (2/21/23): A1C 6.5, TG 64, , HDL 57, LDL 84, NON-HDL 97,  K 4.6, CRE 0.72, LFTs WNL, eGFR 92, HCT 45.7, TSH 2.09 LABS (5/24/22): ; TG 74; LDL 80; NonHDL 95; HDL 50; Cre 0.73; K 4.2; LFTs wnl; eGFR 92; A1c 6.6; TFTs wnl;

## 2023-09-07 NOTE — ASSESSMENT
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 He is overall doing well and has no cardiac complaints at this time. He reports having good exercise capacity. BP is well controlled. - I reviewed echo report --> hypertensive heart disease with preserved LV function, DD and mild AS/AR.  - I reviewed ECG --> no significant change when compared with prior -Continue clopidogrel 75 mg daily -Continue metoprolol 25 mg daily  -continue atorvastatin 40mg daily  -Continue losartan 25 mg daily  -low salt diet/exercise discussed -Increase ambulation as tolerated to aim for approximately 30 minutes of moderate activity 5 days a week.  Heart healthy diet low in sodium, sugars and saturated fats and high in fruits, vegetables and whole grains.  Weight loss.   Patient advised to go to the nearest ED whenever any symptoms persist and/or worsens.  Patient/Family/Caregiver verbalized complete understanding of these instructions.  I spent a total of 25 minutes with more than 50% spent on counseling and coordinating care.  Follow-up in 4 months or sooner if any symptoms.

## 2023-09-07 NOTE — PHYSICAL EXAM
[Well Developed] : well developed [Well Nourished] : well nourished [No Acute Distress] : no acute distress [Normal Venous Pressure] : normal venous pressure [No Carotid Bruit] : no carotid bruit [Normal S1, S2] : normal S1, S2 [No Rub] : no rub [No Gallop] : no gallop [Murmur] : murmur [Clear Lung Fields] : clear lung fields [Good Air Entry] : good air entry [No Respiratory Distress] : no respiratory distress  [Normal Gait] : normal gait [No Edema] : no edema [No Cyanosis] : no cyanosis [No Clubbing] : no clubbing [Moves all extremities] : moves all extremities [No Focal Deficits] : no focal deficits [Normal Speech] : normal speech [Alert and Oriented] : alert and oriented [Normal memory] : normal memory [de-identified] : systolic murmur in sternal border 2-3/6 [de-identified] : systolic murmur 2-3/6 SB.

## 2023-09-12 NOTE — PHYSICAL EXAM
[No Acute Distress] : no acute distress [Well Developed] : well developed [Well Nourished] : well nourished [Well-Appearing] : well-appearing [Normal Sclera/Conjunctiva] : normal sclera/conjunctiva [EOMI] : extraocular movements intact [Normal Outer Ear/Nose] : the outer ears and nose were normal in appearance Rodney Mc(Attending) [PERRL] : pupils equal round and reactive to light [No Lymphadenopathy] : no lymphadenopathy [Normal Oropharynx] : the oropharynx was normal [No JVD] : no jugular venous distention [Supple] : supple [Thyroid Normal, No Nodules] : the thyroid was normal and there were no nodules present [No Respiratory Distress] : no respiratory distress  [No Accessory Muscle Use] : no accessory muscle use [Clear to Auscultation] : lungs were clear to auscultation bilaterally [Normal Rate] : normal rate  [Regular Rhythm] : with a regular rhythm [Normal S1, S2] : normal S1 and S2 [No Abdominal Bruit] : a ~M bruit was not heard ~T in the abdomen [No Murmur] : no murmur heard [No Carotid Bruits] : no carotid bruits [No Varicosities] : no varicosities [Pedal Pulses Present] : the pedal pulses are present [No Edema] : there was no peripheral edema [No Palpable Aorta] : no palpable aorta [No Extremity Clubbing/Cyanosis] : no extremity clubbing/cyanosis [Soft] : abdomen soft [Non Tender] : non-tender [No Masses] : no abdominal mass palpated [Non-distended] : non-distended [No HSM] : no HSM [Normal Bowel Sounds] : normal bowel sounds [Normal Posterior Cervical Nodes] : no posterior cervical lymphadenopathy [No CVA Tenderness] : no CVA  tenderness [No Spinal Tenderness] : no spinal tenderness [Normal Anterior Cervical Nodes] : no anterior cervical lymphadenopathy [No Joint Swelling] : no joint swelling [Grossly Normal Strength/Tone] : grossly normal strength/tone [No Rash] : no rash [Coordination Grossly Intact] : coordination grossly intact [Normal Gait] : normal gait [Deep Tendon Reflexes (DTR)] : deep tendon reflexes were 2+ and symmetric [No Focal Deficits] : no focal deficits [Normal Affect] : the affect was normal [Normal Insight/Judgement] : insight and judgment were intact [FreeTextEntry1] : DEFERRED

## 2023-09-21 NOTE — PATIENT PROFILE ADULT - N[email or web address]_A_NUR
Chief Complaint   Patient presents with    Follow-up    Asthma     Per father, pt was sick and has had some lingering difficulty breathing. yes

## 2023-10-06 ENCOUNTER — APPOINTMENT (OUTPATIENT)
Dept: INTERNAL MEDICINE | Facility: CLINIC | Age: 83
End: 2023-10-06
Payer: MEDICARE

## 2023-10-06 VITALS
HEIGHT: 66 IN | BODY MASS INDEX: 28.28 KG/M2 | TEMPERATURE: 98.2 F | SYSTOLIC BLOOD PRESSURE: 150 MMHG | RESPIRATION RATE: 15 BRPM | OXYGEN SATURATION: 97 % | WEIGHT: 176 LBS | DIASTOLIC BLOOD PRESSURE: 79 MMHG | HEART RATE: 72 BPM

## 2023-10-06 DIAGNOSIS — K21.9 GASTRO-ESOPHAGEAL REFLUX DISEASE W/OUT ESOPHAGITIS: ICD-10-CM

## 2023-10-06 DIAGNOSIS — Z23 ENCOUNTER FOR IMMUNIZATION: ICD-10-CM

## 2023-10-06 DIAGNOSIS — G89.29 PAIN IN RIGHT SHOULDER: ICD-10-CM

## 2023-10-06 DIAGNOSIS — M25.511 PAIN IN RIGHT SHOULDER: ICD-10-CM

## 2023-10-06 LAB
ALBUMIN SERPL ELPH-MCNC: 4.6 G/DL
ALP BLD-CCNC: 108 U/L
ALT SERPL-CCNC: 19 U/L
ANION GAP SERPL CALC-SCNC: 11 MMOL/L
AST SERPL-CCNC: 23 U/L
BILIRUB SERPL-MCNC: 0.4 MG/DL
BUN SERPL-MCNC: 13 MG/DL
CALCIUM SERPL-MCNC: 9.7 MG/DL
CHLORIDE SERPL-SCNC: 97 MMOL/L
CO2 SERPL-SCNC: 26 MMOL/L
CREAT SERPL-MCNC: 0.75 MG/DL
EGFR: 91 ML/MIN/1.73M2
GLUCOSE SERPL-MCNC: 109 MG/DL
POTASSIUM SERPL-SCNC: 4.7 MMOL/L
PROT SERPL-MCNC: 6.7 G/DL
SODIUM SERPL-SCNC: 134 MMOL/L

## 2023-10-06 PROCEDURE — 99214 OFFICE O/P EST MOD 30 MIN: CPT

## 2023-10-06 PROCEDURE — G0008: CPT

## 2023-10-06 PROCEDURE — 90662 IIV NO PRSV INCREASED AG IM: CPT

## 2023-10-07 LAB
ESTIMATED AVERAGE GLUCOSE: 143 MG/DL
HBA1C MFR BLD HPLC: 6.6 %

## 2023-10-09 LAB
CHOLEST SERPL-MCNC: 146 MG/DL
HDLC SERPL-MCNC: 52 MG/DL
LDLC SERPL CALC-MCNC: 75 MG/DL
NONHDLC SERPL-MCNC: 94 MG/DL
TRIGL SERPL-MCNC: 104 MG/DL

## 2023-10-13 PROBLEM — Z87.898 HISTORY OF GROSS HEMATURIA: Status: RESOLVED | Noted: 2022-12-14 | Resolved: 2023-10-13

## 2023-10-13 PROBLEM — R39.9 LOWER URINARY TRACT SYMPTOMS (LUTS): Status: ACTIVE | Noted: 2021-12-01

## 2023-10-13 PROBLEM — C61 MALIGNANT NEOPLASM OF PROSTATE: Status: ACTIVE | Noted: 2019-04-11

## 2023-10-13 PROBLEM — N43.3 BILATERAL HYDROCELE: Status: ACTIVE | Noted: 2021-12-01

## 2023-10-13 PROBLEM — D35.02 ADENOMA OF LEFT ADRENAL GLAND: Status: ACTIVE | Noted: 2021-12-01

## 2023-10-13 PROBLEM — N20.9 UROLITHIASIS: Status: ACTIVE | Noted: 2021-12-01

## 2023-10-15 NOTE — REASON FOR VISIT
Sam Bautista is a 78year old male who presented to [address] [place] [address] on 10/10/23 with bilateral knee pain. Patient stated that he was hiking in woods when he tripped and fell landing on both his knees. He was unable to ambulate, bear weight or flex either knee. He required rescue out of the woods which took several hours. In ER, patient was found to have a right quadriceps tendon rupture and contusion of the left upper leg. He went to the OR on 10/12 and is s/p repair of the tendon rupture done by Dr. Phyllis Georges. He is currently WBAT in TROM brace locked in extension. He is ordered WBAT on LLE with a hinged knee brace. Pain managed by Tylenol 975 mg q8 hours, Lidocaine 5% patch daily, Oxycodone IR 5mg x4gfmoh or Oxycodone split tablet 2.5mg q 4 hours. Anticoagulation managed with  mg BID. Multivitamin 1 tablet every morning, Constipation relief managed with Senokot 17.2 mg daily. This week we will continue to monitor vital signs and lab values. We will manage pain with the above orders so that the patient can participate in his therapy sessions to his optimal abilities. We will teach the patient how to manage and energy conservation so that he may perform ADL's independently as much as he can. We will educated patient on the importance of repositioning and off-loading pressure to help lower risk of skin breakdown. We will prevent falls by keeping personal items within reach and call bell within reach, we will also initiate and maintain hourly rounding.
[FreeTextEntry1] : 81-year-old man with past medical history of CAD s/p PCI  (staged PCI of LAD on 10/1/2021),    diabetes, hyperlipidemia here for follow-up. \par The patient denies chest pain, shortness of breath, palpitations, syncope, presyncope, LE edema, orthopnea. The patient can walk up to 10 blocks without any symptoms. Reports being very active \par Reports compliance with all of  his medications\par \par \par EKG 1/11/2022\par Sinus rhythm, first-degree AV block, PVCs\par RILEY 228\par \par Pharmacological nuclear stress test 6/25/2021\par There is a medium sized reversible perfusion defect of moderate intensity involving the entire inferior and inferolateral myocardial wall suggestive of inducible ischemia in the posterior coronary circulation\par In addition there is a small reversible perfusion defect of mild intensity involving the mid apical anterior myocardial wall suggestive of inducible myocardial ischemia in the anterior LAD territory\par Normal left ventricular systolic function\par \par Echocardiogram 6/25/2021\par Normal left ventricular size and function\par Grade 1 diastolic dysfunction\par Trace MR\par Trace TR\par \par MEDS\par Aspirin Enteric Coated 81 mg oral delayed release tablet: 1 tab(s) orally once a day\par atorvastatin 80 mg oral tablet: 1 tab(s) orally once a day (at bedtime)\par losartan 25 mg oral tablet: 1 tab(s) orally once a day\par metFORMIN 500 mg oral tablet: 1 tab(s) orally 2 times a day\par metoprolol succinate 25 mg oral tablet, extended release: 1 tab(s) orally once a day\par pantoprazole 40 mg oral delayed release tablet: 1 tab(s) orally once a day (before a meal)\par Plavix 75 mg oral tablet: 1 tab(s) orally once a day\par tamsulosin 0.4 mg oral capsule: 1 cap(s) orally once a day\par Vitamin B6 50 mg oral tablet: 1 tab(s) orally once a day\par Vitamin C 500 mg oral tablet: 1 tab(s) orally once a day\par Vitamin D3 50 mcg (2000 intl units) oral capsule: 1 cap(s) orally once a day\par \par EKG 6/15/2021\par Sinus rhythm, left anterior fascicular block\par \par CARDIAC CATH 8/6/2021\par PROCEDURE: CORONARY ANGIOGRAM, LHC, LVGRAM, ROTATIONAL ATHERECTOMY, PCI\par INDICATION: UNSTABLE ANGINA/POSITIVE STRESS TEST\par ATTENDING: DR. MARTIN RYAN MD \par ACCESS: RRA/6F RFA (s/p perclose), 6F RFV ( sutured) \par \par FINDINGS \par mRCA= 90% severely calcific \par dRCA=80% \par LM= 20% distal\par pLAD= 70% severely calcific\par mLAD= patent stent, 80% distal to stent\par pLCx= moderate \par dLCx= severe diffuse\par \par LVEF:55 %\par LVEDP: 8 mmHg\par \par PROCEDURE: \par Successful rotational atherectomy with 1.5 marie and PCI of mid and distal RCA  with LIZ x 2 (Synergy 2.75 x 24 and Synergy 3.5 x 38) with excellent angiographic result \par \par CARDIAC CATH 10/1/2021\par Successful rotational atherectomy with 1.5 bur and PCI of 90% proximal and mid LAD with a 3.0x 26 and a 2.75 x 26 LIZ with excellent angiographic result\par \par \par \par \par

## 2023-10-16 ENCOUNTER — LABORATORY RESULT (OUTPATIENT)
Age: 83
End: 2023-10-16

## 2023-10-16 ENCOUNTER — APPOINTMENT (OUTPATIENT)
Dept: UROLOGY | Facility: CLINIC | Age: 83
End: 2023-10-16
Payer: MEDICARE

## 2023-10-16 VITALS
HEART RATE: 70 BPM | HEIGHT: 66 IN | WEIGHT: 173 LBS | DIASTOLIC BLOOD PRESSURE: 76 MMHG | SYSTOLIC BLOOD PRESSURE: 157 MMHG | OXYGEN SATURATION: 98 % | BODY MASS INDEX: 27.8 KG/M2 | TEMPERATURE: 96.08 F

## 2023-10-16 DIAGNOSIS — R39.9 UNSPECIFIED SYMPTOMS AND SIGNS INVOLVING THE GENITOURINARY SYSTEM: ICD-10-CM

## 2023-10-16 DIAGNOSIS — C61 MALIGNANT NEOPLASM OF PROSTATE: ICD-10-CM

## 2023-10-16 DIAGNOSIS — Z87.898 PERSONAL HISTORY OF OTHER SPECIFIED CONDITIONS: ICD-10-CM

## 2023-10-16 DIAGNOSIS — N20.9 URINARY CALCULUS, UNSPECIFIED: ICD-10-CM

## 2023-10-16 DIAGNOSIS — D35.02 BENIGN NEOPLASM OF LEFT ADRENAL GLAND: ICD-10-CM

## 2023-10-16 DIAGNOSIS — N43.3 HYDROCELE, UNSPECIFIED: ICD-10-CM

## 2023-10-16 PROCEDURE — 99215 OFFICE O/P EST HI 40 MIN: CPT

## 2023-10-16 PROCEDURE — 51798 US URINE CAPACITY MEASURE: CPT

## 2023-10-17 LAB — PSA SERPL-MCNC: 0.74 NG/ML

## 2023-10-18 ENCOUNTER — NON-APPOINTMENT (OUTPATIENT)
Age: 83
End: 2023-10-18

## 2023-11-09 ENCOUNTER — RX RENEWAL (OUTPATIENT)
Age: 83
End: 2023-11-09

## 2023-11-09 RX ORDER — LOSARTAN POTASSIUM 25 MG/1
25 TABLET, FILM COATED ORAL
Qty: 90 | Refills: 1 | Status: ACTIVE | COMMUNITY
Start: 2020-09-16 | End: 1900-01-01

## 2023-11-09 RX ORDER — ATORVASTATIN CALCIUM 40 MG/1
40 TABLET, FILM COATED ORAL
Qty: 90 | Refills: 1 | Status: ACTIVE | COMMUNITY
Start: 2021-11-12 | End: 1900-01-01

## 2023-12-31 NOTE — HEALTH RISK ASSESSMENT
[Good] : ~his/her~  mood as  good [Former] : Former [No] : In the past 12 months have you used drugs other than those required for medical reasons? No [No falls in past year] : Patient reported no falls in the past year [0] : 2) Feeling down, depressed, or hopeless: Not at all (0) [Audit-CScore] : 0 [de-identified] : lightly active [de-identified] : ADA diet [[id number]] : 0 [ColonoscopyDate] : Many years ago  Oriented - self; Oriented - place; Oriented - time

## 2024-01-11 ENCOUNTER — RX RENEWAL (OUTPATIENT)
Age: 84
End: 2024-01-11

## 2024-01-22 ENCOUNTER — APPOINTMENT (OUTPATIENT)
Dept: HEART AND VASCULAR | Facility: CLINIC | Age: 84
End: 2024-01-22
Payer: MEDICARE

## 2024-01-22 VITALS
HEIGHT: 66 IN | TEMPERATURE: 208.4 F | HEART RATE: 87 BPM | DIASTOLIC BLOOD PRESSURE: 80 MMHG | WEIGHT: 173 LBS | OXYGEN SATURATION: 96 % | RESPIRATION RATE: 16 BRPM | BODY MASS INDEX: 27.8 KG/M2 | SYSTOLIC BLOOD PRESSURE: 142 MMHG

## 2024-01-22 PROCEDURE — G2211 COMPLEX E/M VISIT ADD ON: CPT

## 2024-01-22 PROCEDURE — 99214 OFFICE O/P EST MOD 30 MIN: CPT

## 2024-01-22 NOTE — HISTORY OF PRESENT ILLNESS
[FreeTextEntry1] : AGUILAR PETERS is a 83 year y.o. M with medical history significant for CAD s/p PCI to dRCA (8/6/21) and staged PCI of LAD (10/1/2021), diabetes (on oral meds), hyperlipidemia. He is here for follow-up.  The last time I saw him was Sept. 2023.   He is overall in his usual state of health.   Denies CP, SOB, palpitations, orthopnea, dizziness, syncope, LH, edema.  Patient denies changes in his exercise tolerance during the past 6 months. He can walk 10 blocks and can climb 3 flights of stairs.  Sleeps with 1 pillow  Patient reports is very active at work. Compliant with meds  DATA ECG (9/7/23): NSR at 61 bpm w left axis, 1st degree AVB (Riley 236 ms), PRWP and no STT abn ECG (1/5/23): NSR at 67 bpm w left axis, 1st degree AVB (Riley 240ms) and no STT abn  EKG 5/23/2022 SR, First degree AV block Riley 220, occasional ectopic ventricular beats, left axis;  EKG 1/11/2022: Sinus rhythm, first-degree AV block, PVCs, RILEY 228 EKG 6/15/21: Sinus rhythm, left anterior fascicular block  Pharmacological nuclear stress test 6/25/2021 There is a medium sized reversible perfusion defect of moderate intensity involving the entire inferior and inferolateral myocardial wall suggestive of inducible ischemia in the posterior coronary circulation In addition there is a small reversible perfusion defect of mild intensity involving the mid apical anterior myocardial wall suggestive of inducible myocardial ischemia in the anterior LAD territory Normal left ventricular systolic function  ECHO (5/11/23): Nl LV size and function. EF 66%. Mild LVH. Mildly dilated LA. Grade IDD, Mild AR/AS. Trace MR.  Echocardiogram 6/25/21: Normal left ventricular size and function, Grade 1 diastolic dysfunction, Trace MR, Trace TR  MEDS atorvastatin 40 mg oral tablet: 1 tab(s) orally once a day (at bedtime) losartan 25 mg oral tablet: 1 tab(s) orally once a day metFORMIN 500 mg oral tablet: 1 tab(s) orally 2 times a day metoprolol succinate 25 mg oral tablet, extended release: 1 tab(s) orally once a day pantoprazole 40 mg oral delayed release tablet: 1 tab(s) orally once a day (before a meal) Plavix 75 mg oral tablet: 1 tab(s) orally once a day tamsulosin 0.4 mg oral capsule: 1 cap(s) orally once a day Vitamin B6 50 mg oral tablet: 1 tab(s) orally once a day Vitamin C 500 mg oral tablet: 1 tab(s) orally once a day Vitamin D3 50 mcg (2000 intl units) oral capsule: 1 cap(s) orally once a day   CARDIAC CATH 8/6/2021 PROCEDURE: CORONARY ANGIOGRAM, LHC, LVGRAM, ROTATIONAL ATHERECTOMY, PCI INDICATION: UNSTABLE ANGINA/POSITIVE STRESS TEST ATTENDING: DR. MARTIN RYAN MD  ACCESS: RRA/6F RFA (s/p perclose), 6F RFV ( sutured)   FINDINGS  mRCA= 90% severely calcific  dRCA=80%  LM= 20% distal pLAD= 70% severely calcific mLAD= patent stent, 80% distal to stent pLCx= moderate  dLCx= severe diffuse  LVEF:55 % LVEDP: 8 mmHg  PROCEDURE:  Successful rotational atherectomy with 1.5 marie and PCI of mid and distal RCA with LIZ x 2 (Synergy 2.75 x 24 and Synergy 3.5 x 38) with excellent angiographic result   CARDIAC CATH 10/1/2021 Successful rotational atherectomy with 1.5 bur and PCI of 90% proximal and mid LAD with a 3.0x 26 and a 2.75 x 26 LIZ with excellent angiographic result  LABS  (10/6/23) A1C 6.6, K 4.7, CRE 0.75, LFT's wnl, eGFR 91, , , HDL 52, LDL 75, NON-HDL 94. (5/30/23): Cre 0.79; K 4.9; LFTs wnl; eGFR 89; TG 79; ; HDL 50; LDL 72; A1c 6.4; TSH 2.33; Hgb 14; Hct 40.6 (2/21/23): A1C 6.5, TG 64, , HDL 57, LDL 84, NON-HDL 97,  K 4.6, CRE 0.72, LFTs WNL, eGFR 92, HCT 45.7, TSH 2.09 LABS (5/24/22): ; TG 74; LDL 80; NonHDL 95; HDL 50; Cre 0.73; K 4.2; LFTs wnl; eGFR 92; A1c 6.6; TFTs wnl;

## 2024-01-22 NOTE — ASSESSMENT
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 He is overall doing well and has no cardiac complaints at this time. He reports having good exercise capacity. BP is better controlled. - I reviewed echo report --> hypertensive heart disease with preserved LV function, DD and mild AS/AR. - I reviewed ECG --> no significant change when compared with prior -Continue clopidogrel 75 mg daily -Continue metoprolol 25 mg daily -continue atorvastatin 40mg daily -Continue losartan 25 mg daily - He will monitor his BP at home and keep a BP log to bring to next visit  -low salt diet/exercise discussed -Increase ambulation as tolerated to aim for approximately 30 minutes of moderate activity 5 days a week. Heart healthy diet low in sodium, sugars and saturated fats and high in fruits, vegetables and whole grains. Weight loss.   Patient advised to go to the nearest ED whenever any symptoms persist and/or worsens. Patient/Family/Caregiver verbalized complete understanding of these instructions.  I spent a total of 25 minutes with more than 50% spent on counseling and coordinating care.  Follow-up in 4 months or sooner if any symptoms.

## 2024-01-22 NOTE — PHYSICAL EXAM
[Well Developed] : well developed [Well Nourished] : well nourished [No Acute Distress] : no acute distress [Normal Venous Pressure] : normal venous pressure [No Carotid Bruit] : no carotid bruit [Normal S1, S2] : normal S1, S2 [No Rub] : no rub [No Gallop] : no gallop [Murmur] : murmur [Clear Lung Fields] : clear lung fields [Good Air Entry] : good air entry [No Respiratory Distress] : no respiratory distress  [Normal Gait] : normal gait [No Edema] : no edema [No Cyanosis] : no cyanosis [No Clubbing] : no clubbing [Moves all extremities] : moves all extremities [No Focal Deficits] : no focal deficits [Normal Speech] : normal speech [Alert and Oriented] : alert and oriented [Normal memory] : normal memory [de-identified] : systolic murmur in sternal border 3/6 [de-identified] : systolic murmur 3/6 SB.

## 2024-03-08 ENCOUNTER — RX RENEWAL (OUTPATIENT)
Age: 84
End: 2024-03-08

## 2024-03-08 RX ORDER — CLOPIDOGREL BISULFATE 75 MG/1
75 TABLET, FILM COATED ORAL
Qty: 90 | Refills: 3 | Status: ACTIVE | COMMUNITY
Start: 2020-09-16 | End: 1900-01-01

## 2024-05-08 ENCOUNTER — APPOINTMENT (OUTPATIENT)
Dept: INTERNAL MEDICINE | Facility: CLINIC | Age: 84
End: 2024-05-08
Payer: MEDICARE

## 2024-05-08 VITALS
HEIGHT: 66 IN | OXYGEN SATURATION: 98 % | WEIGHT: 173 LBS | DIASTOLIC BLOOD PRESSURE: 80 MMHG | BODY MASS INDEX: 27.8 KG/M2 | SYSTOLIC BLOOD PRESSURE: 155 MMHG | TEMPERATURE: 207.68 F | RESPIRATION RATE: 14 BRPM | HEART RATE: 78 BPM

## 2024-05-08 DIAGNOSIS — R01.1 CARDIAC MURMUR, UNSPECIFIED: ICD-10-CM

## 2024-05-08 DIAGNOSIS — M25.512 PAIN IN RIGHT SHOULDER: ICD-10-CM

## 2024-05-08 DIAGNOSIS — M25.511 PAIN IN RIGHT SHOULDER: ICD-10-CM

## 2024-05-08 PROCEDURE — 99214 OFFICE O/P EST MOD 30 MIN: CPT

## 2024-05-08 RX ORDER — TAMSULOSIN HYDROCHLORIDE 0.4 MG/1
0.4 CAPSULE ORAL
Qty: 90 | Refills: 1 | Status: ACTIVE | COMMUNITY
Start: 2020-05-12 | End: 1900-01-01

## 2024-05-08 RX ORDER — METFORMIN HYDROCHLORIDE 500 MG/1
500 TABLET, COATED ORAL
Qty: 180 | Refills: 1 | Status: ACTIVE | COMMUNITY
Start: 2020-09-16 | End: 1900-01-01

## 2024-05-08 RX ORDER — BLOOD-GLUCOSE METER
EACH MISCELLANEOUS
Qty: 3 | Refills: 4 | Status: ACTIVE | COMMUNITY
Start: 2021-03-04 | End: 1900-01-01

## 2024-05-08 RX ORDER — LANCETS 30 GAUGE
EACH MISCELLANEOUS
Qty: 3 | Refills: 3 | Status: ACTIVE | COMMUNITY
Start: 2021-07-08 | End: 1900-01-01

## 2024-05-08 NOTE — HISTORY OF PRESENT ILLNESS
[de-identified] : COMES FOR F/U HTN ,DM ,DYSLIPIDEMIA AND BPH SX  NEEDS MEDS  SEEN BY  AND CARDIO

## 2024-05-08 NOTE — PHYSICAL EXAM
[No Acute Distress] : no acute distress [Normal Sclera/Conjunctiva] : normal sclera/conjunctiva [Normal Outer Ear/Nose] : the outer ears and nose were normal in appearance [No JVD] : no jugular venous distention [Normal] : no respiratory distress, lungs were clear to auscultation bilaterally and no accessory muscle use [Normal Rate] : normal [Normal S1] : normal S1 [Normal S2] : normal S2 [S3] : no S3 [S4] : no S4 [No Murmur] : no murmurs heard [II] : a grade 2 [No Pitting Edema] : no pitting edema present [Right Carotid Bruit] : no bruit heard over the right carotid [Left Carotid Bruit] : no bruit heard over the left carotid [Right Femoral Bruit] : no bruit heard over the right femoral artery [Left Femoral Bruit] : no bruit heard over the left femoral artery [2+] : left 2+ [No Abnormalities] : the abdominal aorta was not enlarged and no bruit was heard [Soft] : abdomen soft [Non Tender] : non-tender [Normal Bowel Sounds] : normal bowel sounds [Normal Anterior Cervical Nodes] : no anterior cervical lymphadenopathy [No CVA Tenderness] : no CVA  tenderness [Coordination Grossly Intact] : coordination grossly intact [No Focal Deficits] : no focal deficits [Alert and Oriented x3] : oriented to person, place, and time

## 2024-05-08 NOTE — ASSESSMENT
[FreeTextEntry1] : 83 Y OLD MALE WITH PMX OF HTN ,DM ,DYSLIPIDEMIA ,CAD ,PROSTATE CA ,BPH SX= = LABS  LUIS SHOULDER ARTHRALGIAS = PT ORDERED

## 2024-05-09 LAB
ALBUMIN SERPL ELPH-MCNC: 4.6 G/DL
ALP BLD-CCNC: 97 U/L
ALT SERPL-CCNC: 16 U/L
ANION GAP SERPL CALC-SCNC: 15 MMOL/L
AST SERPL-CCNC: 19 U/L
BILIRUB SERPL-MCNC: 0.4 MG/DL
BUN SERPL-MCNC: 15 MG/DL
CALCIUM SERPL-MCNC: 9.2 MG/DL
CHLORIDE SERPL-SCNC: 97 MMOL/L
CHOLEST SERPL-MCNC: 143 MG/DL
CO2 SERPL-SCNC: 21 MMOL/L
CREAT SERPL-MCNC: 0.77 MG/DL
EGFR: 90 ML/MIN/1.73M2
ESTIMATED AVERAGE GLUCOSE: 131 MG/DL
GLUCOSE SERPL-MCNC: 136 MG/DL
HBA1C MFR BLD HPLC: 6.2 %
HDLC SERPL-MCNC: 50 MG/DL
LDLC SERPL CALC-MCNC: 72 MG/DL
NONHDLC SERPL-MCNC: 93 MG/DL
POTASSIUM SERPL-SCNC: 4.5 MMOL/L
PROT SERPL-MCNC: 6.5 G/DL
SODIUM SERPL-SCNC: 134 MMOL/L
TRIGL SERPL-MCNC: 115 MG/DL

## 2024-05-20 ENCOUNTER — APPOINTMENT (OUTPATIENT)
Dept: HEART AND VASCULAR | Facility: CLINIC | Age: 84
End: 2024-05-20
Payer: MEDICARE

## 2024-05-20 ENCOUNTER — NON-APPOINTMENT (OUTPATIENT)
Age: 84
End: 2024-05-20

## 2024-05-20 DIAGNOSIS — E78.5 HYPERLIPIDEMIA, UNSPECIFIED: ICD-10-CM

## 2024-05-20 DIAGNOSIS — I35.0 NONRHEUMATIC AORTIC (VALVE) STENOSIS: ICD-10-CM

## 2024-05-20 DIAGNOSIS — I25.10 ATHEROSCLEROTIC HEART DISEASE OF NATIVE CORONARY ARTERY W/OUT ANGINA PECTORIS: ICD-10-CM

## 2024-05-20 DIAGNOSIS — I10 ESSENTIAL (PRIMARY) HYPERTENSION: ICD-10-CM

## 2024-05-20 DIAGNOSIS — E11.9 TYPE 2 DIABETES MELLITUS W/OUT COMPLICATIONS: ICD-10-CM

## 2024-05-20 PROCEDURE — 93000 ELECTROCARDIOGRAM COMPLETE: CPT

## 2024-05-20 PROCEDURE — 99214 OFFICE O/P EST MOD 30 MIN: CPT | Mod: 25

## 2024-05-20 RX ORDER — METOPROLOL TARTRATE 25 MG/1
25 TABLET, FILM COATED ORAL
Qty: 90 | Refills: 1 | Status: ACTIVE | COMMUNITY
Start: 2020-03-12 | End: 1900-01-01

## 2024-05-20 NOTE — HISTORY OF PRESENT ILLNESS
[FreeTextEntry1] : AGUILAR PETERS is a 84 year y.o. M with medical history significant for CAD s/p PCI to dRCA (8/6/21) and staged PCI of LAD (10/1/2021), diabetes (on oral meds), hyperlipidemia. He is here for follow-up.  The last time I saw him was Jan 2024.   He is overall in his usual state of health.   Home BP readings range 121-139/64-71 mmHg Denies CP, SOB, palpitations, orthopnea, dizziness, syncope, LH, edema.  Patient denies changes in his exercise tolerance during the past 6 months. He can walk 10 blocks and can climb 3 flights of stairs.  Sleeps with 1 pillow  Patient reports is very active at work. Compliant with meds   MEDS atorvastatin 40 mg oral tablet: 1 tab(s) orally once a day (at bedtime) losartan 25 mg oral tablet: 1 tab(s) orally once a day metFORMIN 500 mg oral tablet: 1 tab(s) orally 2 times a day metoprolol succinate 25 mg oral tablet, extended release: 1 tab(s) orally once a day pantoprazole 40 mg oral delayed release tablet: 1 tab(s) orally once a day (before a meal) Plavix 75 mg oral tablet: 1 tab(s) orally once a day tamsulosin 0.4 mg oral capsule: 1 cap(s) orally once a day  DATA ECG (5/20/24): NSR at 66 bpm w left axis, 1st degree AVB (Riley 238 ms), PRWP and no STT abn  ECG (9/7/23): NSR at 61 bpm w left axis, 1st degree AVB (Riley 236 ms), PRWP and no STT abn ECG (1/5/23): NSR at 67 bpm w left axis, 1st degree AVB (Riley 240ms) and no STT abn  EKG 5/23/2022 SR, First degree AV block Riley 220, occasional ectopic ventricular beats, left axis;  EKG 1/11/2022: Sinus rhythm, first-degree AV block, PVCs, RILEY 228 EKG 6/15/21: Sinus rhythm, left anterior fascicular block  Pharmacological nuclear stress test 6/25/2021 There is a medium sized reversible perfusion defect of moderate intensity involving the entire inferior and inferolateral myocardial wall suggestive of inducible ischemia in the posterior coronary circulation In addition there is a small reversible perfusion defect of mild intensity involving the mid apical anterior myocardial wall suggestive of inducible myocardial ischemia in the anterior LAD territory Normal left ventricular systolic function  ECHO (5/11/23): Nl LV size and function. EF 66%. Mild LVH. Mildly dilated LA. Grade IDD, Mild AR/AS. Trace MR.  Echocardiogram 6/25/21: Normal left ventricular size and function, Grade 1 diastolic dysfunction, Trace MR, Trace TR  CARDIAC CATH 8/6/2021 PROCEDURE: CORONARY ANGIOGRAM, LHC, LVGRAM, ROTATIONAL ATHERECTOMY, PCI INDICATION: UNSTABLE ANGINA/POSITIVE STRESS TEST ATTENDING: DR. MARTIN RYAN MD  ACCESS: RRA/6F RFA (s/p perclose), 6F RFV ( sutured)   FINDINGS  mRCA= 90% severely calcific  dRCA=80%  LM= 20% distal pLAD= 70% severely calcific mLAD= patent stent, 80% distal to stent pLCx= moderate  dLCx= severe diffuse  LVEF:55 % LVEDP: 8 mmHg  PROCEDURE:  Successful rotational atherectomy with 1.5 marie and PCI of mid and distal RCA with LIZ x 2 (Synergy 2.75 x 24 and Synergy 3.5 x 38) with excellent angiographic result   CARDIAC CATH 10/1/2021 Successful rotational atherectomy with 1.5 bur and PCI of 90% proximal and mid LAD with a 3.0x 26 and a 2.75 x 26 LIZ with excellent angiographic result  LABS  (5/8/24): K 4.5, CRE 0.77, LFT's wnl, eGFR 90, , , HDL 50, LDL 72, NON-HDL 93, A1C 6.2. (10/6/23) A1C 6.6, K 4.7, CRE 0.75, LFT's wnl, eGFR 91, , , HDL 52, LDL 75, NON-HDL 94. (5/30/23): Cre 0.79; K 4.9; LFTs wnl; eGFR 89; TG 79; ; HDL 50; LDL 72; A1c 6.4; TSH 2.33; Hgb 14; Hct 40.6 (2/21/23): A1C 6.5, TG 64, , HDL 57, LDL 84, NON-HDL 97, K 4.6, CRE 0.72, LFTs WNL, eGFR 92, HCT 45.7, TSH 2.09 LABS (5/24/22): ; TG 74; LDL 80; NonHDL 95; HDL 50; Cre 0.73; K 4.2; LFTs wnl; eGFR 92; A1c 6.6; TFTs wnl;

## 2024-05-20 NOTE — PHYSICAL EXAM
[Well Developed] : well developed [Well Nourished] : well nourished [No Acute Distress] : no acute distress [Normal Venous Pressure] : normal venous pressure [No Carotid Bruit] : no carotid bruit [Normal S1, S2] : normal S1, S2 [No Rub] : no rub [No Gallop] : no gallop [Murmur] : murmur [Clear Lung Fields] : clear lung fields [Good Air Entry] : good air entry [No Respiratory Distress] : no respiratory distress  [Normal Gait] : normal gait [No Edema] : no edema [No Cyanosis] : no cyanosis [No Clubbing] : no clubbing [Moves all extremities] : moves all extremities [No Focal Deficits] : no focal deficits [Normal Speech] : normal speech [Alert and Oriented] : alert and oriented [Normal memory] : normal memory [de-identified] : systolic murmur in sternal border 3/6 [de-identified] : systolic murmur 3/6 SB.

## 2024-05-20 NOTE — ASSESSMENT
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 He is overall doing well and has no cardiac complaints at this time. He reports having good exercise capacity. BP is controlled. - I reviewed echo report --> hypertensive heart disease with preserved LV function, DD and mild AS/AR. - I reviewed ECG --> no significant change when compared with prior -Continue clopidogrel 75 mg daily -Continue metoprolol 25 mg daily -continue atorvastatin 40mg daily -Continue losartan 25 mg daily - He will monitor his BP at home and keep a BP log to bring to next visit  -low salt diet/exercise discussed -Increase ambulation as tolerated to aim for approximately 30 minutes of moderate activity 5 days a week. Heart healthy diet low in sodium, sugars and saturated fats and high in fruits, vegetables and whole grains. Weight loss.   Patient advised to go to the nearest ED whenever any symptoms persist and/or worsens. Patient/Family/Caregiver verbalized complete understanding of these instructions.  I spent a total of 25 minutes with more than 50% spent on counseling and coordinating care.  Follow-up in 4 months or sooner if any symptoms.

## 2024-08-14 ENCOUNTER — RX RENEWAL (OUTPATIENT)
Age: 84
End: 2024-08-14

## 2024-09-13 ENCOUNTER — RX RENEWAL (OUTPATIENT)
Age: 84
End: 2024-09-13

## 2024-09-23 ENCOUNTER — APPOINTMENT (OUTPATIENT)
Age: 84
End: 2024-09-23
Payer: MEDICARE

## 2024-09-23 VITALS
HEIGHT: 66 IN | BODY MASS INDEX: 27.8 KG/M2 | TEMPERATURE: 208.94 F | OXYGEN SATURATION: 97 % | HEART RATE: 59 BPM | SYSTOLIC BLOOD PRESSURE: 137 MMHG | RESPIRATION RATE: 15 BRPM | WEIGHT: 173 LBS | DIASTOLIC BLOOD PRESSURE: 78 MMHG

## 2024-09-23 DIAGNOSIS — I25.10 ATHEROSCLEROTIC HEART DISEASE OF NATIVE CORONARY ARTERY W/OUT ANGINA PECTORIS: ICD-10-CM

## 2024-09-23 DIAGNOSIS — I35.0 NONRHEUMATIC AORTIC (VALVE) STENOSIS: ICD-10-CM

## 2024-09-23 DIAGNOSIS — E78.5 HYPERLIPIDEMIA, UNSPECIFIED: ICD-10-CM

## 2024-09-23 DIAGNOSIS — I10 ESSENTIAL (PRIMARY) HYPERTENSION: ICD-10-CM

## 2024-09-23 PROCEDURE — G2211 COMPLEX E/M VISIT ADD ON: CPT

## 2024-09-23 PROCEDURE — 99214 OFFICE O/P EST MOD 30 MIN: CPT

## 2024-09-23 NOTE — PHYSICAL EXAM
[Well Developed] : well developed [Well Nourished] : well nourished [No Acute Distress] : no acute distress [Normal Venous Pressure] : normal venous pressure [No Carotid Bruit] : no carotid bruit [Normal S1, S2] : normal S1, S2 [No Rub] : no rub [No Gallop] : no gallop [Murmur] : murmur [Clear Lung Fields] : clear lung fields [Good Air Entry] : good air entry [No Respiratory Distress] : no respiratory distress  [Normal Gait] : normal gait [No Edema] : no edema [No Cyanosis] : no cyanosis [No Clubbing] : no clubbing [Moves all extremities] : moves all extremities [No Focal Deficits] : no focal deficits [Normal Speech] : normal speech [Alert and Oriented] : alert and oriented [Normal memory] : normal memory [de-identified] : systolic murmur in sternal border 3/6 [de-identified] : systolic murmur 3/6 SB.

## 2024-09-23 NOTE — ASSESSMENT
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 He is overall doing well and has no cardiac complaints at this time. He reports having good exercise capacity. BP is controlled.  - I reviewed echo report --> hypertensive heart disease with preserved LV function, DD and mild AS/AR. - I reviewed ECG --> no significant change when compared with prior - CAD s/p PCI. No ischemia eval in 3yrs --> Schedule a stress MPI to evaluate for ischemia and risk stratify -Continue clopidogrel 75 mg daily -Continue metoprolol 25 mg daily -continue atorvastatin 40mg daily -Continue losartan 25 mg daily - He will monitor his BP at home and keep a BP log to bring to next visit  -low salt diet/exercise discussed -Increase ambulation as tolerated to aim for approximately 30 minutes of moderate activity 5 days a week. Heart healthy diet low in sodium, sugars and saturated fats and high in fruits, vegetables and whole grains. Weight loss.   Patient advised to go to the nearest ED whenever any symptoms persist and/or worsens. Patient/Family/Caregiver verbalized complete understanding of these instructions.  I spent a total of 25 minutes with more than 50% spent on counseling and coordinating care.   RTO after test results are available.

## 2024-09-23 NOTE — HISTORY OF PRESENT ILLNESS
[FreeTextEntry1] : AGUILAR PETERS is a 84 year y.o. M with medical history significant for CAD s/p PCI to dRCA (8/6/21) and staged PCI of LAD (10/1/2021), diabetes (on oral meds), hyperlipidemia, HTN, mild AR/AS. He is here for follow-up.  The last time I saw him was May 2024.   He is overall in his usual state of health.   No cardiac complaints at this time.  Denies CP, SOB, palpitations, orthopnea, dizziness, syncope, LH, edema.  Patient denies changes in his exercise tolerance during the past 6 months. He can walk 10 blocks and can climb 3 flights of stairs.  Sleeps with 1 pillow  Patient reports is very active at work. Compliant with meds   MEDS atorvastatin 40 mg oral tablet: 1 tab(s) orally once a day (at bedtime) losartan 25 mg oral tablet: 1 tab(s) orally once a day metFORMIN 500 mg oral tablet: 1 tab(s) orally 2 times a day metoprolol succinate 25 mg oral tablet, extended release: 1 tab(s) orally once a day pantoprazole 40 mg oral delayed release tablet: 1 tab(s) orally once a day (before a meal) Plavix 75 mg oral tablet: 1 tab(s) orally once a day tamsulosin 0.4 mg oral capsule: 1 cap(s) orally once a day  DATA ECG (5/20/24): NSR at 66 bpm w left axis, 1st degree AVB (Riley 238 ms), PRWP and no STT abn  ECG (9/7/23): NSR at 61 bpm w left axis, 1st degree AVB (Riley 236 ms), PRWP and no STT abn ECG (1/5/23): NSR at 67 bpm w left axis, 1st degree AVB (Riley 240ms) and no STT abn  EKG 5/23/2022 SR, First degree AV block Riley 220, occasional ectopic ventricular beats, left axis;  EKG 1/11/2022: Sinus rhythm, first-degree AV block, PVCs, RILEY 228 EKG 6/15/21: Sinus rhythm, left anterior fascicular block  Pharmacological NST 6/25/2021 There is a medium sized reversible perfusion defect of moderate intensity involving the entire inferior and inferolateral myocardial wall suggestive of inducible ischemia in the posterior coronary circulation In addition there is a small reversible perfusion defect of mild intensity involving the mid apical anterior myocardial wall suggestive of inducible myocardial ischemia in the anterior LAD territory Normal left ventricular systolic function  ECHO (5/11/23): Nl LV size and function. EF 66%. Mild LVH. Mildly dilated LA. Grade IDD, Mild AR/AS. Trace MR.  Echocardiogram 6/25/21: Normal left ventricular size and function, Grade 1 diastolic dysfunction, Trace MR, Trace TR  CARDIAC CATH 8/6/2021 PROCEDURE: CORONARY ANGIOGRAM, LHC, LVGRAM, ROTATIONAL ATHERECTOMY, PCI INDICATION: UNSTABLE ANGINA/POSITIVE STRESS TEST ATTENDING: DR. MARTIN RYAN MD  ACCESS: RRA/6F RFA (s/p perclose), 6F RFV ( sutured)   FINDINGS  mRCA= 90% severely calcific  dRCA=80%  LM= 20% distal pLAD= 70% severely calcific mLAD= patent stent, 80% distal to stent pLCx= moderate  dLCx= severe diffuse  LVEF:55 % LVEDP: 8 mmHg  PROCEDURE:  Successful rotational atherectomy with 1.5 marie and PCI of mid and distal RCA with LIZ x 2 (Synergy 2.75 x 24 and Synergy 3.5 x 38) with excellent angiographic result   CARDIAC CATH 10/1/2021 Successful rotational atherectomy with 1.5 bur and PCI of 90% proximal and mid LAD with a 3.0x 26 and a 2.75 x 26 LIZ with excellent angiographic result  LABS  (5/8/24): K 4.5, CRE 0.77, LFT's wnl, eGFR 90, , , HDL 50, LDL 72, NON-HDL 93, A1C 6.2. (10/6/23) A1C 6.6, K 4.7, CRE 0.75, LFT's wnl, eGFR 91, , , HDL 52, LDL 75, NON-HDL 94. (5/30/23): Cre 0.79; K 4.9; LFTs wnl; eGFR 89; TG 79; ; HDL 50; LDL 72; A1c 6.4; TSH 2.33; Hgb 14; Hct 40.6 (2/21/23): A1C 6.5, TG 64, , HDL 57, LDL 84, NON-HDL 97, K 4.6, CRE 0.72, LFTs WNL, eGFR 92, HCT 45.7, TSH 2.09 LABS (5/24/22): ; TG 74; LDL 80; NonHDL 95; HDL 50; Cre 0.73; K 4.2; LFTs wnl; eGFR 92; A1c 6.6; TFTs wnl;

## 2024-10-07 ENCOUNTER — APPOINTMENT (OUTPATIENT)
Age: 84
End: 2024-10-07
Payer: MEDICARE

## 2024-10-07 VITALS — DIASTOLIC BLOOD PRESSURE: 68 MMHG | SYSTOLIC BLOOD PRESSURE: 150 MMHG

## 2024-10-07 VITALS
TEMPERATURE: 207.86 F | RESPIRATION RATE: 15 BRPM | SYSTOLIC BLOOD PRESSURE: 152 MMHG | BODY MASS INDEX: 27.97 KG/M2 | DIASTOLIC BLOOD PRESSURE: 82 MMHG | OXYGEN SATURATION: 97 % | HEART RATE: 71 BPM | WEIGHT: 174 LBS | HEIGHT: 66 IN

## 2024-10-07 DIAGNOSIS — K21.9 GASTRO-ESOPHAGEAL REFLUX DISEASE W/OUT ESOPHAGITIS: ICD-10-CM

## 2024-10-07 DIAGNOSIS — E11.9 TYPE 2 DIABETES MELLITUS W/OUT COMPLICATIONS: ICD-10-CM

## 2024-10-07 DIAGNOSIS — Z00.00 ENCOUNTER FOR GENERAL ADULT MEDICAL EXAMINATION W/OUT ABNORMAL FINDINGS: ICD-10-CM

## 2024-10-07 DIAGNOSIS — Z23 ENCOUNTER FOR IMMUNIZATION: ICD-10-CM

## 2024-10-07 DIAGNOSIS — I10 ESSENTIAL (PRIMARY) HYPERTENSION: ICD-10-CM

## 2024-10-07 DIAGNOSIS — I35.0 NONRHEUMATIC AORTIC (VALVE) STENOSIS: ICD-10-CM

## 2024-10-07 LAB
25(OH)D3 SERPL-MCNC: 32.6 NG/ML
ALBUMIN SERPL ELPH-MCNC: 4.3 G/DL
ALP BLD-CCNC: 96 U/L
ALT SERPL-CCNC: 15 U/L
ANION GAP SERPL CALC-SCNC: 15 MMOL/L
APPEARANCE: CLEAR
AST SERPL-CCNC: 21 U/L
BILIRUB SERPL-MCNC: 0.5 MG/DL
BILIRUBIN URINE: NEGATIVE
BLOOD URINE: NEGATIVE
BUN SERPL-MCNC: 12 MG/DL
CALCIUM SERPL-MCNC: 9.1 MG/DL
CHLORIDE SERPL-SCNC: 96 MMOL/L
CHOLEST SERPL-MCNC: 139 MG/DL
CO2 SERPL-SCNC: 23 MMOL/L
COLOR: YELLOW
CREAT SERPL-MCNC: 0.7 MG/DL
CREAT SPEC-SCNC: 52 MG/DL
EGFR: 92 ML/MIN/1.73M2
GLUCOSE QUALITATIVE U: NEGATIVE MG/DL
GLUCOSE SERPL-MCNC: 119 MG/DL
HCT VFR BLD CALC: 39.7 %
HDLC SERPL-MCNC: 52 MG/DL
HGB BLD-MCNC: 13.6 G/DL
KETONES URINE: NEGATIVE MG/DL
LDLC SERPL CALC-MCNC: 72 MG/DL
LEUKOCYTE ESTERASE URINE: NEGATIVE
MCHC RBC-ENTMCNC: 30.8 PG
MCHC RBC-ENTMCNC: 34.3 GM/DL
MCV RBC AUTO: 90 FL
MICROALBUMIN 24H UR DL<=1MG/L-MCNC: <1.2 MG/DL
MICROALBUMIN/CREAT 24H UR-RTO: NORMAL MG/G
NITRITE URINE: NEGATIVE
NONHDLC SERPL-MCNC: 87 MG/DL
PH URINE: 7.5
PLATELET # BLD AUTO: 234 K/UL
POTASSIUM SERPL-SCNC: 4.6 MMOL/L
PROT SERPL-MCNC: 6.3 G/DL
PROTEIN URINE: NEGATIVE MG/DL
RBC # BLD: 4.41 M/UL
RBC # FLD: 12.5 %
SODIUM SERPL-SCNC: 135 MMOL/L
SPECIFIC GRAVITY URINE: 1.01
TRIGL SERPL-MCNC: 79 MG/DL
TSH SERPL-ACNC: 1.92 UIU/ML
UROBILINOGEN URINE: 0.2 MG/DL
WBC # FLD AUTO: 4.92 K/UL

## 2024-10-07 PROCEDURE — G0439: CPT

## 2024-10-07 PROCEDURE — G0009: CPT

## 2024-10-07 PROCEDURE — 90662 IIV NO PRSV INCREASED AG IM: CPT

## 2024-10-07 PROCEDURE — 90677 PCV20 VACCINE IM: CPT

## 2024-10-07 PROCEDURE — G0008: CPT

## 2024-10-08 LAB
ESTIMATED AVERAGE GLUCOSE: 134 MG/DL
HBA1C MFR BLD HPLC: 6.3 %

## 2024-10-11 LAB — VIT B12 SERPL-MCNC: 433 PG/ML

## 2024-10-14 ENCOUNTER — APPOINTMENT (OUTPATIENT)
Dept: UROLOGY | Facility: CLINIC | Age: 84
End: 2024-10-14
Payer: MEDICARE

## 2024-10-14 DIAGNOSIS — C61 MALIGNANT NEOPLASM OF PROSTATE: ICD-10-CM

## 2024-10-14 DIAGNOSIS — R39.9 UNSPECIFIED SYMPTOMS AND SIGNS INVOLVING THE GENITOURINARY SYSTEM: ICD-10-CM

## 2024-10-14 DIAGNOSIS — N20.9 URINARY CALCULUS, UNSPECIFIED: ICD-10-CM

## 2024-10-14 DIAGNOSIS — N43.3 HYDROCELE, UNSPECIFIED: ICD-10-CM

## 2024-10-14 DIAGNOSIS — D35.02 BENIGN NEOPLASM OF LEFT ADRENAL GLAND: ICD-10-CM

## 2024-10-14 PROCEDURE — 51798 US URINE CAPACITY MEASURE: CPT

## 2024-10-14 PROCEDURE — 99214 OFFICE O/P EST MOD 30 MIN: CPT

## 2024-10-15 ENCOUNTER — NON-APPOINTMENT (OUTPATIENT)
Age: 84
End: 2024-10-15

## 2024-10-15 LAB — PSA SERPL-MCNC: 0.81 NG/ML

## 2024-11-12 ENCOUNTER — RX RENEWAL (OUTPATIENT)
Age: 84
End: 2024-11-12

## 2024-11-15 ENCOUNTER — RX RENEWAL (OUTPATIENT)
Age: 84
End: 2024-11-15

## 2025-01-23 ENCOUNTER — RX RENEWAL (OUTPATIENT)
Age: 85
End: 2025-01-23

## 2025-01-31 ENCOUNTER — RX RENEWAL (OUTPATIENT)
Age: 85
End: 2025-01-31

## 2025-02-10 ENCOUNTER — APPOINTMENT (OUTPATIENT)
Age: 85
End: 2025-02-10
Payer: MEDICARE

## 2025-02-10 VITALS
HEIGHT: 66 IN | OXYGEN SATURATION: 97 % | WEIGHT: 175 LBS | TEMPERATURE: 208.76 F | RESPIRATION RATE: 14 BRPM | SYSTOLIC BLOOD PRESSURE: 147 MMHG | DIASTOLIC BLOOD PRESSURE: 78 MMHG | HEART RATE: 68 BPM | BODY MASS INDEX: 28.12 KG/M2

## 2025-02-10 DIAGNOSIS — I35.0 NONRHEUMATIC AORTIC (VALVE) STENOSIS: ICD-10-CM

## 2025-02-10 DIAGNOSIS — E78.5 HYPERLIPIDEMIA, UNSPECIFIED: ICD-10-CM

## 2025-02-10 DIAGNOSIS — I10 ESSENTIAL (PRIMARY) HYPERTENSION: ICD-10-CM

## 2025-02-10 DIAGNOSIS — I25.10 ATHEROSCLEROTIC HEART DISEASE OF NATIVE CORONARY ARTERY W/OUT ANGINA PECTORIS: ICD-10-CM

## 2025-02-10 DIAGNOSIS — E11.9 TYPE 2 DIABETES MELLITUS W/OUT COMPLICATIONS: ICD-10-CM

## 2025-02-10 PROCEDURE — 99214 OFFICE O/P EST MOD 30 MIN: CPT

## 2025-02-11 LAB
ALBUMIN SERPL ELPH-MCNC: 4.3 G/DL
ALP BLD-CCNC: 95 U/L
ALT SERPL-CCNC: 21 U/L
ANION GAP SERPL CALC-SCNC: 10 MMOL/L
AST SERPL-CCNC: 19 U/L
BILIRUB SERPL-MCNC: 0.3 MG/DL
BUN SERPL-MCNC: 14 MG/DL
CALCIUM SERPL-MCNC: 9.1 MG/DL
CHLORIDE SERPL-SCNC: 98 MMOL/L
CHOLEST SERPL-MCNC: 145 MG/DL
CO2 SERPL-SCNC: 26 MMOL/L
CREAT SERPL-MCNC: 0.73 MG/DL
EGFR: 91 ML/MIN/1.73M2
ESTIMATED AVERAGE GLUCOSE: 128 MG/DL
GLUCOSE SERPL-MCNC: 117 MG/DL
HBA1C MFR BLD HPLC: 6.1 %
HDLC SERPL-MCNC: 57 MG/DL
LDLC SERPL CALC-MCNC: 73 MG/DL
NONHDLC SERPL-MCNC: 89 MG/DL
POTASSIUM SERPL-SCNC: 4.4 MMOL/L
PROT SERPL-MCNC: 6.2 G/DL
SODIUM SERPL-SCNC: 133 MMOL/L
TRIGL SERPL-MCNC: 77 MG/DL

## 2025-03-27 ENCOUNTER — RX RENEWAL (OUTPATIENT)
Age: 85
End: 2025-03-27

## 2025-04-04 ENCOUNTER — RX RENEWAL (OUTPATIENT)
Age: 85
End: 2025-04-04

## 2025-05-02 ENCOUNTER — RX RENEWAL (OUTPATIENT)
Age: 85
End: 2025-05-02

## 2025-05-28 ENCOUNTER — NON-APPOINTMENT (OUTPATIENT)
Age: 85
End: 2025-05-28

## 2025-05-28 ENCOUNTER — RESULT REVIEW (OUTPATIENT)
Age: 85
End: 2025-05-28

## 2025-05-28 ENCOUNTER — OUTPATIENT (OUTPATIENT)
Dept: OUTPATIENT SERVICES | Facility: HOSPITAL | Age: 85
LOS: 1 days | End: 2025-05-28
Payer: MEDICARE

## 2025-05-28 DIAGNOSIS — Z98.890 OTHER SPECIFIED POSTPROCEDURAL STATES: Chronic | ICD-10-CM

## 2025-05-28 DIAGNOSIS — E78.5 HYPERLIPIDEMIA, UNSPECIFIED: ICD-10-CM

## 2025-05-28 PROCEDURE — 78452 HT MUSCLE IMAGE SPECT MULT: CPT

## 2025-05-28 PROCEDURE — 93018 CV STRESS TEST I&R ONLY: CPT

## 2025-05-28 PROCEDURE — 82962 GLUCOSE BLOOD TEST: CPT

## 2025-05-28 PROCEDURE — A9500: CPT

## 2025-05-28 PROCEDURE — 78452 HT MUSCLE IMAGE SPECT MULT: CPT | Mod: 26

## 2025-05-28 PROCEDURE — 93017 CV STRESS TEST TRACING ONLY: CPT

## 2025-05-28 PROCEDURE — 93016 CV STRESS TEST SUPVJ ONLY: CPT

## 2025-06-05 ENCOUNTER — NON-APPOINTMENT (OUTPATIENT)
Age: 85
End: 2025-06-05

## 2025-06-05 ENCOUNTER — APPOINTMENT (OUTPATIENT)
Age: 85
End: 2025-06-05
Payer: MEDICARE

## 2025-06-05 VITALS
OXYGEN SATURATION: 98 % | HEART RATE: 72 BPM | RESPIRATION RATE: 15 BRPM | DIASTOLIC BLOOD PRESSURE: 76 MMHG | BODY MASS INDEX: 27.97 KG/M2 | WEIGHT: 174 LBS | HEIGHT: 66 IN | SYSTOLIC BLOOD PRESSURE: 144 MMHG

## 2025-06-05 PROCEDURE — 93000 ELECTROCARDIOGRAM COMPLETE: CPT

## 2025-06-05 PROCEDURE — 99214 OFFICE O/P EST MOD 30 MIN: CPT | Mod: 25

## 2025-06-07 ENCOUNTER — RX RENEWAL (OUTPATIENT)
Age: 85
End: 2025-06-07

## 2025-06-11 ENCOUNTER — APPOINTMENT (OUTPATIENT)
Age: 85
End: 2025-06-11
Payer: MEDICARE

## 2025-06-11 ENCOUNTER — NON-APPOINTMENT (OUTPATIENT)
Age: 85
End: 2025-06-11

## 2025-06-11 VITALS
OXYGEN SATURATION: 97 % | DIASTOLIC BLOOD PRESSURE: 74 MMHG | RESPIRATION RATE: 15 BRPM | WEIGHT: 172 LBS | TEMPERATURE: 208.4 F | BODY MASS INDEX: 27.64 KG/M2 | SYSTOLIC BLOOD PRESSURE: 153 MMHG | HEART RATE: 64 BPM | HEIGHT: 66 IN

## 2025-06-11 LAB
ALBUMIN SERPL ELPH-MCNC: 4.5 G/DL
ALP BLD-CCNC: 107 U/L
ALT SERPL-CCNC: 24 U/L
ANION GAP SERPL CALC-SCNC: 16 MMOL/L
AST SERPL-CCNC: 22 U/L
BILIRUB SERPL-MCNC: 0.5 MG/DL
BUN SERPL-MCNC: 11 MG/DL
CALCIUM SERPL-MCNC: 9.3 MG/DL
CHLORIDE SERPL-SCNC: 95 MMOL/L
CHOLEST SERPL-MCNC: 139 MG/DL
CO2 SERPL-SCNC: 22 MMOL/L
CREAT SERPL-MCNC: 0.69 MG/DL
EGFRCR SERPLBLD CKD-EPI 2021: 92 ML/MIN/1.73M2
ESTIMATED AVERAGE GLUCOSE: 131 MG/DL
GLUCOSE SERPL-MCNC: 106 MG/DL
HBA1C MFR BLD HPLC: 6.2 %
HDLC SERPL-MCNC: 56 MG/DL
LDLC SERPL-MCNC: 67 MG/DL
NONHDLC SERPL-MCNC: 83 MG/DL
POTASSIUM SERPL-SCNC: 4.6 MMOL/L
PROT SERPL-MCNC: 6.4 G/DL
SODIUM SERPL-SCNC: 133 MMOL/L
TRIGL SERPL-MCNC: 83 MG/DL

## 2025-06-11 PROCEDURE — 99214 OFFICE O/P EST MOD 30 MIN: CPT

## 2025-06-20 ENCOUNTER — RX RENEWAL (OUTPATIENT)
Age: 85
End: 2025-06-20

## 2025-08-08 ENCOUNTER — RX RENEWAL (OUTPATIENT)
Age: 85
End: 2025-08-08

## 2025-08-13 ENCOUNTER — RX RENEWAL (OUTPATIENT)
Age: 85
End: 2025-08-13